# Patient Record
(demographics unavailable — no encounter records)

---

## 2017-01-05 NOTE — DIAGNOSTIC IMAGING REPORT
CHEST ONE VIEW PORTABLE



CLINICAL HISTORY: Sepsis dyspnea



COMPARISON STUDY:  No previous studies for comparison.



FINDINGS: The bones soft tissues and hemidiaphragms are normal. The

cardiomediastinal silhouette is normal. The lungs are clear. The pulmonary

vasculature is normal. 



IMPRESSION:  Negative chest. 









Electronically signed by:  Carlitos Leigh M.D.

1/5/2017 10:46 AM

## 2017-01-05 NOTE — DIAGNOSTIC IMAGING REPORT
EXAMINATION: RENAL ULTRASOUND



CLINICAL HISTORY: Urinary retention    



COMPARISON STUDY:  Biliary ultrasound dated 1/5/2017



FINDINGS: The right kidney measures 13.2 cm. The left kidney measures 12.5 cm. 

There is no evidence of hydronephrosis.  There are no renal masses.



The bladder was nearly empty. There is residual a Ascencio catheter. There is

bladder wall thickening (15 mm).

                 

IMPRESSION : 

1. Bladder wall thickening

2. No renal abnormalities identified







Electronically signed by:  Antoni Andino M.D.

1/5/2017 10:41 PM



Dictated Date/Time:  1/5/2017 10:40 PM

## 2017-01-05 NOTE — DIAGNOSTIC IMAGING REPORT
MRI OF THE BRAIN WITHOUT AND WITH IV CONTRAST



CLINICAL HISTORY: generalized weakness  URINARY RETENTION. LOWER EXTREMITY

WEAKNESS.



COMPARISON STUDY:  No previous studies for comparison. 



TECHNIQUE: MRI of the brain was performed from the vertex to the skull base

utilizing various T1 and T2 weighted sequences. Following the IV administration

of 9 mL of Gadavist contrast, additional enhanced images were obtained.



FINDINGS: 

Sagittal T1, axial diffusion, proton density and T2 weighted axial, coronal

FLAIR, and pre and post axial T1-weighted images were acquired. These were

supplemented with post gadolinium coronal T1 weighted images. 

No intra or extra-axial mass lesions are visualized.

Axial diffusion-weighted images reveal no evidence of acute or subacute

infarction.

There is no evidence of ventricular dilatation.

Proton density T2-weighted and FLAIR images reveal there are multiple foci of

increased T2 signal within the white matter. The distribution and orientation of

the lesions is suspicious for a demyelinating process such as multiple

sclerosis.

There are no abnormal flow voids.

There is no evidence of pathologic enhancement.



There is a 24 mm left occipital scalp mass, suspicious for a sebaceous cyst.



There is complete opacification of the left maxillary sinus. There is

opacification of the left frontal sinus.





IMPRESSION:  

1. Multiple foci of increased T2 signal within the periventricular deep white

matter, and subcortical U fibers. The distribution and orientation of the

lesions is suspicious for demyelinating process such as multiple sclerosis

2. No evidence of pathologic enhancement

3. No evidence of acute or subacute infarction

4. Inflammatory changes in left maxillary and frontal sinuses

5. Left occipital scalp mass likely representing a sebaceous cyst







Electronically signed by:  Antoni Andino M.D.

1/5/2017 5:10 PM



Dictated Date/Time:  1/5/2017 5:06 PM

## 2017-01-05 NOTE — EMERGENCY ROOM VISIT NOTE
History


Report prepared by Zenia:  Nikhil Faulkner


Under the Supervision of:  Dr. Mike Lazcano D.O.


First contact with patient:  09:53


Chief Complaint:  URINARY SYMPTOMS


Stated Complaint:  GENERALIZED WEAKNESS


Nursing Triage Summary:  


INCREASING WEAKNESS WITH FALLS AND URINARY SYMPTOMS SINCE MONDAY.





History of Present Illness


The patient is a 58 year old male who presents to the Emergency Room with 

complaints of persistent weakness that started around 3 days ago. He has had MS 

for 33 years, but it has been getting worse. 3 nights ago, the patient's legs 

gave out and he fell, but did not get hurt. He has also felt tired the past few 

days. The patient still currently feels weak, but is fine if he doesn't try to 

stand up. He also notes smelly and cloudy urine. He has never catheterized 

himself before. The patient has chronic swelling in his legs and back pain. Per 

the patient's girlfriend, the patient had been running a bit of a fever, with 

chills all night in addition to losing control of his bladder. The patient 

denies vomiting, sore throat, cough, or headache. He called his primary care 

physician today (Dr. Moran), and the doctor's nurse told the patient to be taken 

to the ED. The patient does not use any tobacco products. He drinks occasional 

alcohol. He has never had urinary retention. The patient took "Urinary Pain 

Relief" prior to arrival. He takes Coumadin for blood clots in both ankles.





   Source of History:  patient, spouse/significant other


   Onset:  3 days ago


   Position:  other (global - weakness)


   Timing:  other (persistent)


   Associated Symptoms:  + back pain (chronic), + chills, + fatigue, + fevers, 

+ urinary symptoms (losing control of bladder; smelly and cloudy urine), No 

headache, No sorethroat, No vomiting


Note:


Associated symptoms: Chronic swelling in legs.





Review of Systems


See HPI for pertinent positives & negatives. A total of 10 systems reviewed and 

were otherwise negative.





Past Medical & Surgical


Medical Problems:


(1) Multiple sclerosis








Family History





Cancer





Social History


Smoking Status:  Never Smoker


Smokeless Tobacco Use:  No


Alcohol Use:  occasionally


Marital Status:  


Housing Status:  lives alone


Occupation Status:  employed





Current/Historical Medications


Scheduled


Baclofen (Baclofen *), 10 MG PO BID


Multiple Vitamins W/ Minerals (Centrum Multivitamin Flav), 1 TAB PO DAILY


Warfarin Sod (Jantoven), 10 MG PO alternate


Warfarin Sod (Jantoven), 7.5 MG PO alternate





Scheduled PRN


Sildenafil Citrate (Viagra), 1 TAB PO DAILY PRN for PRN





Allergies


Coded Allergies:  


     No Known Allergies (Verified , 11/24/08)





Physical Exam


Vital Signs











  Date Time  Temp Pulse Resp B/P Pulse Ox O2 Delivery O2 Flow Rate FiO2


 


1/5/17 14:22      Room Air  


 


1/5/17 13:30  85 18 138/76 98 Room Air  


 


1/5/17 13:04  79      


 


1/5/17 11:04  74 18 126/75 97 Room Air  


 


1/5/17 10:21     96 Room Air  


 


1/5/17 09:47 37.1 83 18 128/85 97 Room Air  


 


1/5/17 09:47  83      











Physical Exam


GENERAL:  Patient is awake, alert, mildly anxious appearing but comfortable.


EYES: The conjunctivae are clear.  The pupils are round and reactive. 


EARS, NOSE, MOUTH AND THROAT: The nose is without any evidence of any 

deformity. Mucous membranes are moist tongue is midline 


NECK: The neck is nontender and supple.


RESPIRATORY: Normal respiratory effort is noted there is no evidence of 

wheezing rhonchi or rales


CARDIOVASCULAR:  Regular rate and rhythm noted there no murmurs rubs or gallops 

normal S1 normal S2 


GASTROINTESTINAL: The abdomen is mildly distended with suprapubic tenderness to 

palpation. No guarding or rigidity noted.


BACK:  No midline tenderness or or step-off noted range of motion in flexion 

extension as well as rotation no signs of muscle spasm noted


MUSCULOSKELETAL/EXTREMITIES: There is no evidence of gross deformity full range 

of motion is noted in the hips and shoulders


SKIN: Pedal edema bilaterally. No signs of cellulitis.


NEUROLOGIC:  Patient is awake alert and oriented x3.





Medical Decision & Procedures


ER Provider


Diagnostic Interpretation:


X-ray results as stated below per interpretation by me and the radiologist. 








CHEST ONE VIEW PORTABLE





CLINICAL HISTORY: Sepsis dyspnea





COMPARISON STUDY:  No previous studies for comparison.





FINDINGS: The bones soft tissues and hemidiaphragms are normal. The


cardiomediastinal silhouette is normal. The lungs are clear. The pulmonary


vasculature is normal. 





IMPRESSION:  Negative chest. 








Electronically signed by:  Carlitos Leigh M.D.


1/5/2017 10:46 AM





Laboratory Results


1/5/17 10:40








Red Blood Count 4.55, Mean Corpuscular Volume 90.3, Mean Corpuscular Hemoglobin 

31.6, Mean Corpuscular Hemoglobin Concent 35.0, Mean Platelet Volume 10.1, 

Neutrophils (%) (Auto) 88.8, Lymphocytes (%) (Auto) 6.5, Monocytes (%) (Auto) 

4.5, Eosinophils (%) (Auto) 0.0, Basophils (%) (Auto) 0.1, Neutrophils # (Auto) 

9.18, Lymphocytes # (Auto) 0.67, Monocytes # (Auto) 0.46, Eosinophils # (Auto) 

0.00, Basophils # (Auto) 0.01





1/5/17 10:40

















Test


  1/5/17


10:32 1/5/17


10:40 1/5/17


10:58 1/5/17


13:19


 


Urine Color ORANGE    


 


Urine Appearance


  SLIGHTLY


CLOUDY (CLEAR) 


  


  


 


 


Urine pH  (4.5-7.5)    


 


Urine Specific Gravity


  1.019


(1.000-1.030) 


  


  


 


 


Urine Protein NEG (NEG)    


 


Urine Glucose (UA)  (NEG)    


 


Urine Ketones  (NEG)    


 


Urine Occult Blood  (NEG)    


 


Urine Nitrite  (NEG)    


 


Urine Bilirubin  (NEG)    


 


Urine Urobilinogen  (NEG)    


 


Urine Leukocyte Esterase  (NEG)    


 


Urine RBC 0-4 /hpf (0-4)    


 


Urine WBC >30 /hpf (0-5)    


 


Urine Epithelial Cells


  10-20 /lpf


(0-5) 


  


  


 


 


Urine Bacteria 4+ (NEG)    


 


White Blood Count


  


  10.33 K/uL


(4.8-10.8) 


  


 


 


Red Blood Count


  


  4.55 M/uL


(4.7-6.1) 


  


 


 


Hemoglobin


  


  14.4 g/dL


(14.0-18.0) 


  


 


 


Hematocrit  41.1 % (42-52)   


 


Mean Corpuscular Volume


  


  90.3 fL


() 


  


 


 


Mean Corpuscular Hemoglobin


  


  31.6 pg


(25-34) 


  


 


 


Mean Corpuscular Hemoglobin


Concent 


  35.0 g/dl


(32-36) 


  


 


 


Platelet Count


  


  156 K/uL


(130-400) 


  


 


 


Mean Platelet Volume


  


  10.1 fL


(7.4-10.4) 


  


 


 


Neutrophils (%) (Auto)  88.8 %   


 


Lymphocytes (%) (Auto)  6.5 %   


 


Monocytes (%) (Auto)  4.5 %   


 


Eosinophils (%) (Auto)  0.0 %   


 


Basophils (%) (Auto)  0.1 %   


 


Neutrophils # (Auto)


  


  9.18 K/uL


(1.4-6.5) 


  


 


 


Lymphocytes # (Auto)


  


  0.67 K/uL


(1.2-3.4) 


  


 


 


Monocytes # (Auto)


  


  0.46 K/uL


(0.11-0.59) 


  


 


 


Eosinophils # (Auto)


  


  0.00 K/uL


(0-0.5) 


  


 


 


Basophils # (Auto)


  


  0.01 K/uL


(0-0.2) 


  


 


 


RDW Standard Deviation


  


  42.4 fL


(36.4-46.3) 


  


 


 


RDW Coefficient of Variation


  


  12.8 %


(11.5-14.5) 


  


 


 


Immature Granulocyte % (Auto)  0.1 %   


 


Immature Granulocyte # (Auto)


  


  0.01 K/uL


(0.00-0.02) 


  


 


 


Erythrocyte Sedimentation Rate


  


  41 mm/hr


(0-14) 


  


 


 


Prothrombin Time


  


  24.7 SECONDS


(9.0-12.0) 


  


 


 


Prothromb Time International


Ratio 


  2.2 (0.9-1.1) 


  


  


 


 


Activated Partial


Thromboplast Time 


  52.7 SECONDS


(21.0-31.0) 


  


 


 


Partial Thromboplastin Ratio  2.0   


 


Anion Gap


  


  8.0 mmol/L


(3-11) 


  


 


 


Est Creatinine Clear Calc


Drug Dose 


  80.4 ml/min 


  


  


 


 


Estimated GFR (


American) 


  85.3 


  


  


 


 


Estimated GFR (Non-


American 


  73.6 


  


  


 


 


BUN/Creatinine Ratio  10.6 (10-20)   


 


Calcium Level


  


  8.9 mg/dl


(8.5-10.1) 


  


 


 


Phosphorus Level


  


  1.7 mg/dl


(2.5-4.9) 


  


 


 


Magnesium Level


  


  2.5 mg/dl


(1.8-2.4) 


  


 


 


Total Bilirubin


  


  0.6 mg/dl


(0.2-1) 


  


 


 


Aspartate Amino Transf


(AST/SGOT) 


  52 U/L (15-37) 


  


  


 


 


Alanine Aminotransferase


(ALT/SGPT) 


  91 U/L (12-78) 


  


  


 


 


Alkaline Phosphatase


  


  206 U/L


() 


  


 


 


Total Creatine Kinase


  


  224 U/L


() 


  


 


 


Creatine Kinase MB


  


  < 0.5 ng/ml


(0.5-3.6) 


  


 


 


Creatine Kinase MB Ratio   (0-3.0)   


 


Troponin I


  


  < 0.015 ng/ml


(0-0.045) 


  


 


 


C-Reactive Protein


  


  16.40 mg/dl


(0-0.29) 


  


 


 


Total Protein


  


  8.0 gm/dl


(6.4-8.2) 


  


 


 


Albumin


  


  3.7 gm/dl


(3.4-5.0) 


  


 


 


Globulin


  


  4.3 gm/dl


(2.5-4.0) 


  


 


 


Albumin/Globulin Ratio  0.9 (0.9-2)   


 


Lipase


  


  170 U/L


() 


  


 


 


Thyroid Stimulating Hormone


(TSH) 


  0.924 uIu/ml


(0.300-4.500) 


  


 


 


Bedside Lactic Acid Venous


  


  


  1.32 mmol/L


(0.90-1.70) 


 





Laboratory results per my review.





Medications Administered











 Medications


  (Trade)  Dose


 Ordered  Sig/Miguel


 Route  Start Time


 Stop Time Status Last Admin


Dose Admin


 


 Sodium Chloride


  (Nss 1000ml)  500 ml @ 


 999 mls/hr  Q31M ONCE


 IV  1/5/17 10:03


 1/5/17 10:33 DC 1/5/17 10:03


999 MLS/HR


 


 Ceftriaxone Sodium


  (Rocephin Inj)  1 gm  NOW  STAT


 IV  1/5/17 11:24


 1/5/17 11:25 DC 1/5/17 11:57


1 GM











ECG


Indication:  weakness


Rate (beats per minute):  80


Rhythm:  normal sinus


Findings:  no ectopy, other (incomplete RBBB noted, no acute ST segment 

abnormalities)


Comparison ECG Date:  no prior available





ED Course


0958: The patient was evaluated in room B4B. A complete history and physical 

examination were performed. 





1003: Ordered  ml @ 999 mls/hr IV.





1124: Ordered Rocephin Inj 1 gm IV.





1148: I reevaluated the patient and he is resting comfortably. The patient 

verbally expressed understanding and agreement of the treatment plan. The 

patient will be evaluated for further treatment.





1151: [facility] - he will evaluate 

the patient for further treatment.





Medical Decision








Differential diagnosis:


Etiologies such as viral syndrome, otitis, pharyngitis, pneumonia, influenza, 

meningitis, urinary tract infection, sepsis, bacteremia, as well as others were 

entertained.





Nursing notes reviewed.





Additional history is obtained from the patient's significant other.





The patient is a 58-year-old male who was a history of MS who presented to the 

emergency department for an evaluation of lower extremity weakness. He also has 

been noticing that he had a fever as an outpatient and is been having 

difficulty urinating. The patient is normally able to urinate on his own but 

has noticed urinary incontinence recently. The patient was found to have 

urinary retention as well as signs of urinary tract infection on urinalysis. I 

discussed the patient's laboratory and radiographic studies with him. At this 

time I feels condition is likely consistent with an MS flare with superimposed 

urinary tract infection. Given his reported fever I do feel that he is high 

risk for this infection. This reason I discussed his case with the on-call 

[facility] hospitalist group. They have agreed to evaluate the patient in 

the emergency department for further management and disposition.





Consults


Time Called:  1150


Consulting Physician:  Dr. Milagro HUBBARD internist


Returned Call:  1151


I discussed the patient with Dr. Milagro HUBBARD internist - he will evaluate the 

patient for further treatment.





Impression





 Primary Impression:  Exacerbation of multiple sclerosis


 Additional Impressions:  Lower extremity weakness, Urinary retention, UTI (

urinary tract infection)





Scribe Attestation


The scribe's documentation has been prepared under my direction and personally 

reviewed by me in its entirety. I confirm that the note above accurately 

reflects all work, treatment, procedures, and medical decision making performed 

by me.





Departure Information


Dispostion


Being Evaluated By Hospitalist





Referrals


Mike Moran M.D. (PCP)





Patient Instructions


A Signature Page, My [facility]

## 2017-01-05 NOTE — DIAGNOSTIC IMAGING REPORT
MRI LUMBAR SPINE COMBINATION



CLINICAL HISTORY: Left lower extremity weakness, urinary retention. Possible

cauda equina syndrome.    



TECHNIQUE: Sagittal and axial T1, T2 and STIR images were obtained. Imaging was

performed before and after the administration of 9 cc of intravenous Gadavist



COMPARISON STUDY:  Conventional radiographic study of the lumbar spine dated

10/26/2016 



OBSERVATIONS:



The vertebral bodies and posterior elements appear intact. There is no abnormal

bony signal present to suggest a marrow replacement process.



L1-2: There is a mild circumferential disc bulge present. There is no

significant spinal or foraminal stenosis.



L2-3: There is a circumferential disc bulge present. There is an annular fissure

present. There is mild spinal stenosis. There is minor bilateral foraminal

narrowing.



L3-4: There is a circumferential disc bulge and small central disc protrusion.

There is moderate spinal stenosis present. There is no significant foraminal

narrowing



L4-5: There is a small annular fissure. There is no significant spinal or

foraminal stenosis. No focal herniations are visualized



L5-S1: There is a circumferential disc bulge present. There is no significant

spinal stenosis. There is mild bilateral foraminal narrowing.



Postcontrast images reveal no pathologic enhancement. On the sagittal

T2-weighted images there is equivocal linear increase in the distal cord at the

T11-12 level.



IMPRESSION: 

1. Multilevel spondylitic changes

2. Mild spinal stenosis at the L2-3 level, and moderate spinal stenosis at the

L3-4 level.

3. On the sagittal images, there is an equivocal thin linear focus of increased

T2 signal within the distal thoracic cord at the T11-12 level

4. No evidence of pathologic enhancement. No evidence of pathologic marrow

replacement.







Electronically signed by:  Antoni Andino M.D.

1/5/2017 4:59 PM



Dictated Date/Time:  1/5/2017 4:53 PM

## 2017-01-05 NOTE — DIAGNOSTIC IMAGING REPORT
BILIARY ULTRASOUND



CLINICAL HISTORY: elevated LFTs    



COMPARISON STUDY:  No previous studies for comparison.



FINDINGS: The pancreas appears sonographically normal. The gallbladder is

contracted. No calculi are visualized. There is no ductal dilatation. The common

bile duct measures 3 mm. No hepatic masses are visualized. There is no

right-sided hydronephrosis



IMPRESSION:  Normal biliary ultrasound 









Electronically signed by:  Antoni Andino M.D.

1/5/2017 6:46 PM



Dictated Date/Time:  1/5/2017 6:45 PM

## 2017-01-05 NOTE — HISTORY AND PHYSICAL
History & Physical


Date & Time of Service:


Jan 5, 2017 at 13:15


Chief Complaint:


Generalized Weakness


Primary Care Physician:


Cedrick Moran M.D.


History of Present Illness


Source:  patient, family


Patient is a pleasant 59 y/o male, with PMHx of MS and DVT, who presented to 

the ED because of generalized weakness x4 days. Since Monday (1/5), patient has 

been experiencing falls- he denies any injuries. He states he is extremely 

weak. When he tries to stand, he cannot hold his weight up. Patient also admits 

to urinary incontinence x1. While in the ED, a Rowell was placed and patient 

voided +4L of urine. Patient does not take any medications for MS except 

Baclofen. It has been years since he has had a follow-up or brain MRI. Per 

patient he has never been hospitalized with an acute MS flare. He has had MS 

for 33 years. He routinely goes to the gym. Up until this event, he was able to 

ambulate with a walker without any significant difficulties. He has been eating 

and drinking OK. He denies any recent illness. He denies any recent ill 

contacts. He has been on Warfarin for 10+ years. He admits to unchanged chronic 

lower lumbar pain. Patient denies any fever, chills, sweats, lightheadedness, 

dizziness, vision changes, CP, palpitations, edema, SOB, wheezing, cough, 

abdominal pain, nausea, vomiting, diarrhea, melena, numbness/tingling, anxiety/

depression, active bleeding, or new skin discoloration/changes.





Past Medical/Surgical History


1. MS


2. DVT   








Family History





Cancer





Social History


Smoking Status:  Never Smoker


Smokeless Tobacco Use:  No


Marital Status:  


Occupational Status:  employed





Immunizations


History of Influenza Vaccine:  Yes


History of Tetanus Vaccine?:  Unknown


History of Pneumococcal:  Unknown


History of Hepatitis B Vaccine:  Unknown





Allergies


Coded Allergies:  


     No Known Allergies (Verified , 11/24/08)





Home Medications


Scheduled


Baclofen (Baclofen *), 10 MG PO BID


Multiple Vitamins W/ Minerals (Centrum Multivitamin Flav), 1 TAB PO DAILY


Warfarin Sod (Jantoven), 10 MG PO alternate


Warfarin Sod (Jantoven), 7.5 MG PO alternate





Scheduled PRN


Sildenafil Citrate (Viagra), 1 TAB PO DAILY PRN for PRN





Physical Exam


Vital Signs











  Date Time  Temp Pulse Resp B/P Pulse Ox O2 Delivery O2 Flow Rate FiO2


 


1/5/17 13:04  79      


 


1/5/17 11:04  74 18 126/75 97 Room Air  


 


1/5/17 10:21     96 Room Air  


 


1/5/17 09:47 37.1 83 18 128/85 97 Room Air  


 


1/5/17 09:47  83      








General Appearance:  no apparent distress


Head:  normocephalic, atraumatic


Eyes:  normal inspection, PERRL


ENT:  hearing grossly normal


Neck:  supple


Respiratory/Chest:  lungs clear, no respiratory distress, no accessory muscle 

use


Cardiovascular:  regular rate, rhythm, normal peripheral pulses


Abdomen/GI:  normal bowel sounds, non tender, soft


Back:  normal inspection


Extremities/Musculoskelatal:  no calf tenderness, + swelling (+1 pitting edema 

of bilateral lower extremities- baseline per patient )


Neurologic/Psych:  no motor/sensory deficits (no major/alarming motor/sensory 

deficits ), alert, normal mood/affect, oriented x 3


Skin:  normal color, warm/dry, no rash





Diagnostics


Laboratory Results





Results Past 24 Hours








Test


  1/5/17


10:32 1/5/17


10:40 1/5/17


10:58 Range/Units


 


 


Urine Color ORANGE    


 


Urine Appearance SLIGHTLY CLOUDY   CLEAR  


 


Urine pH    4.5-7.5  


 


Urine Specific Gravity 1.019   1.000-1.030  


 


Urine Protein NEG   NEG  


 


Urine Glucose (UA)    NEG  


 


Urine Ketones    NEG  


 


Urine Occult Blood    NEG  


 


Urine Nitrite    NEG  


 


Urine Bilirubin    NEG  


 


Urine Urobilinogen    NEG  


 


Urine Leukocyte Esterase    NEG  


 


Urine RBC 0-4   0-4  /hpf


 


Urine WBC >30   0-5  /hpf


 


Urine Epithelial Cells 10-20   0-5  /lpf


 


Urine Bacteria 4+   NEG  


 


White Blood Count  10.33  4.8-10.8  K/uL


 


Red Blood Count  4.55  4.7-6.1  M/uL


 


Hemoglobin  14.4  14.0-18.0  g/dL


 


Hematocrit  41.1  42-52  %


 


Mean Corpuscular Volume  90.3    fL


 


Mean Corpuscular Hemoglobin  31.6  25-34  pg


 


Mean Corpuscular Hemoglobin


Concent 


  35.0


  


  32-36  g/dl


 


 


Platelet Count  156  130-400  K/uL


 


Mean Platelet Volume  10.1  7.4-10.4  fL


 


Neutrophils (%) (Auto)  88.8   %


 


Lymphocytes (%) (Auto)  6.5   %


 


Monocytes (%) (Auto)  4.5   %


 


Eosinophils (%) (Auto)  0.0   %


 


Basophils (%) (Auto)  0.1   %


 


Neutrophils # (Auto)  9.18  1.4-6.5  K/uL


 


Lymphocytes # (Auto)  0.67  1.2-3.4  K/uL


 


Monocytes # (Auto)  0.46  0.11-0.59  K/uL


 


Eosinophils # (Auto)  0.00  0-0.5  K/uL


 


Basophils # (Auto)  0.01  0-0.2  K/uL


 


RDW Standard Deviation  42.4  36.4-46.3  fL


 


RDW Coefficient of Variation  12.8  11.5-14.5  %


 


Immature Granulocyte % (Auto)  0.1   %


 


Immature Granulocyte # (Auto)  0.01  0.00-0.02  K/uL


 


Erythrocyte Sedimentation Rate  41  0-14  mm/hr


 


Prothrombin Time


  


  24.7


  


  9.0-12.0


SECONDS


 


Prothromb Time International


Ratio 


  2.2


  


  0.9-1.1  


 


 


Activated Partial


Thromboplast Time 


  52.7


  


  21.0-31.0


SECONDS


 


Partial Thromboplastin Ratio  2.0   


 


Sodium Level  137  136-145  mmol/L


 


Potassium Level  4.1  3.5-5.1  mmol/L


 


Chloride Level  103    mmol/L


 


Carbon Dioxide Level  26  21-32  mmol/L


 


Anion Gap  8.0  3-11  mmol/L


 


Blood Urea Nitrogen  12  7-18  mg/dl


 


Creatinine


  


  1.10


  


  0.60-1.40


mg/dl


 


Est Creatinine Clear Calc


Drug Dose 


  80.4


  


   ml/min


 


 


Estimated GFR (


American) 


  85.3


  


   


 


 


Estimated GFR (Non-


American 


  73.6


  


   


 


 


BUN/Creatinine Ratio  10.6  10-20  


 


Random Glucose  99  70-99  mg/dl


 


Calcium Level  8.9  8.5-10.1  mg/dl


 


Phosphorus Level  1.7  2.5-4.9  mg/dl


 


Magnesium Level  2.5  1.8-2.4  mg/dl


 


Total Bilirubin  0.6  0.2-1  mg/dl


 


Aspartate Amino Transf


(AST/SGOT) 


  52


  


  15-37  U/L


 


 


Alanine Aminotransferase


(ALT/SGPT) 


  91


  


  12-78  U/L


 


 


Alkaline Phosphatase  206    U/L


 


Total Creatine Kinase  224    U/L


 


Creatine Kinase MB  < 0.5  0.5-3.6  ng/ml


 


Creatine Kinase MB Ratio    0-3.0  


 


Troponin I  < 0.015  0-0.045  ng/ml


 


C-Reactive Protein  16.40  0-0.29  mg/dl


 


Total Protein  8.0  6.4-8.2  gm/dl


 


Albumin  3.7  3.4-5.0  gm/dl


 


Globulin  4.3  2.5-4.0  gm/dl


 


Albumin/Globulin Ratio  0.9  0.9-2  


 


Lipase  170    U/L


 


Bedside Lactic Acid Venous


  


  


  1.32


  0.90-1.70


mmol/L








 Microbiology Results


1/5/17 Blood Culture, Received


         Pending


1/5/17 Blood Culture, Received


         Pending


1/5/17 Urine Culture, Received


         Pending





Diagnostic Radiology


CHEST ONE VIEW PORTABLE





CLINICAL HISTORY: Sepsis dyspnea





COMPARISON STUDY:  No previous studies for comparison.





FINDINGS: The bones soft tissues and hemidiaphragms are normal. The


cardiomediastinal silhouette is normal. The lungs are clear. The pulmonary


vasculature is normal. 





IMPRESSION:  Negative chest. 














Electronically signed by:  Carlitos Leigh M.D.


1/5/2017 10:46 AM





 The status of this report is Signed.   


 Draft = Not yet reviewed or approved by Radiologist.  


 Signed = Reviewed and approved by Radiologist.





EKG


CEDRICK HERNANDEZ ID:[id number] 05-JAN-2017 10:13:41 [facility]


Normal sinus rhythm


Incomplete right bundle branch block


Borderline ECG


No previous ECGs available


25mm/s 10mm/mV 150Hz 8.0 SP2 12SL 241 [id number]: [id number]


Referred by: Referred Self Unconfirmed


Vent. rate 80 BPM


DE interval 138 ms


QRS duration 98 ms


QT/QTc 348/401 ms


P-R-T axes 48 9 32


1958 (58 yr)


Male 


Room:


Loc:15


Technician





Impression


Assessment and Plan


59 y/o male, with PMHx of MS and DVT, who presented to the ED because of 

generalized weakness x4 days.





?MS flare with generalized weakness:


-Admit med/surg


-Consult neurology, appreciate recommendations


   --Need to r/o all other causes for weakness prior to starting IV steroids 


-Continue Baclofen 10 mg PO BID 


-Pending blood cultures


-Follow CBC and PRP 


-Check b12, folate, and TSH


-Check lyme and RPR serology 


-MRI combo of brain  


-Consult PT/OT





Urinary retention:


-U/A dirty, pending urine culture 


-Check MRI of lumbar spine 





Elevated LFTs:


-Check liver U/S





hx of DVT:


-Continue Coumadin 10 mg PO QOD and 7.5 mg PO QOD





DVT prophylaxis:


-Continue Coumadin 


-ROSLYN and SCDs





GI Prophylaxis:


-Maalox PRN


-IV Zofran PRN


-Colace and/or Milk of Mag PRN





Code Status:


-LEVEL I, FULL





Level of Care


Med/Surg





Resuscitation Status


FULL RESUSCITATION





VTE Prophylaxis


VTE Risk Assessment Done? Y/N:  Yes


Risk Level:  Low


Given or contraindicated:  Warfarin (Coumadin), T.E.D. Stockings, SCD's





Reviewed:  Pt Seen/Exam by NEENA Lu Notes, Labs, RAD, EKG


History


Agree with above PA H&P/ROS/PMH/PSH. Pt having significant weakness more 

pronounced on his right side which is the side he has had progressively 

worsening weakness on for many years. He just spiked a fever when he got to the 

floor tonight. His brain MRI did show multiple MS type lesions and the L-spine 

MRI also showed an equivocal T11 cord lesion. Brain MRI also showed left 

maxillary and frontal sinusitis. He does admit he has had some scalp tenderness 

more on top of his head and did have subjective fevers at home for the last 2-3 

days. His son and daughter in law were just visiting and right afterwards came 

down with the flu.


Since placement of his Rowell in the ER and draining 4L immediately, he has 

continued to have a very brisk diuresis with a total of 8000 mL in the last 13 

hours approximately.


Constitutional:  acknowledges: fever, weakness


EENTM:  acknowledges: other (scap tenderness, watery eyes)


Respiratory:  negative: cough, short of breath


Cardiovascular:  denies chest pain


Gastrointestinal/Abdominal:  negative: abdominal pain, diarrhea, nausea


Genitourinary:  positive other (retention)


Musculoskeletal:  positive: back pain (lower)


Skin:  negative: rash


Neurological/Psych:  positive: numbness (in right 4th and 5th fingers x 25 yrs)

, weakness (generalized but more so in right hand and right leg, right leg 

chronic weakness and foot drop x 25 yrs), negative: headache


Hematologic/Lymphatic:  positive: blood clots (h/o DVT)


All Other Systems:  Reviewed and Negative


General Appearance:  WD/WN, no apparent distress


Eye Exam:  bilateral eye other (mild watery discharge OU)


Ears, Nose, Throat:  hearing grossly normal, other (no facial tenderness, no 

droop of face)


Neck:  non-tender, normal inspection, trachea midline


Respiratory:  lungs clear, normal breath sounds, no respiratory distress, no 

accessory muscle use, respiratory distress


Cardiovascular:  normal peripheral pulses, regular rate, rhythm, no gallop, no 

murmur, other (trace edema legs bilat)


Gastrointestinal:  normal bowel sounds, non tender, soft, no organomegaly, 

other (Rowell catheter in place, normal appearing genitalia)


Extremities:  non-tender


Neurologic/Psychiatric:  alert, normal mood/affect, oriented x 3, motor 

weakness (RUE with 4/5 strength throughout and 3/5 strength in wrist extension 

and dorsal interosseous; LUE 5/5 strength throughout; RLE 2/5 strength 

throughout; LLE 4/5 strength throughout), sensory deficit (rt 4th and 5th 

fingers)


Skin Characteristics:  normal color, warm/dry


Assessment/Plan


Agree with above PA A/P as above with the following exceptions/additions:





Fever, UTI, Sinusitis on MRI brain, MS flare secondary to acute illness-->





-check flu swab


-treat for acute sinusitis and UTI with Rocephin 1 gram IC q12h


-follow Urcx and BCxs


-Spoke with Dr. Villalobos on the phone, will start IV Solu Medrol qday x 3 days after 

antibiotics have been on board for about a day


-PT/OT consults





Urinary retention/brisk diuresis of 8L urine in 13 hours-


-continue Rowell catheter for now and voiding trial after MS flare improves


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


-repeat PRP now to ensure no lyte abnormalities


-may need Urology consult and teaching for self-cath if fails voiding trial





Proph-on coumadin, follow INR

## 2017-01-06 NOTE — UROLOGY CONSULTATION
History


General


Date of Service:


Jan 6, 2017.


Primary Care Physician:


Mike Moran M.D.


Pt seen a urologist before?:  No





Imaging


Imaging:  Ultrasound





Laboratory


Labs were reviewed and are within normal limits unless listed below. Labs are 

available in the chart and at [facility]





Problem List


Medical Problems:


(1) Exacerbation of multiple sclerosis


Status: Acute  





(2) Lower extremity weakness


Status: Acute  





(3) Urinary retention


Status: Acute  





(4) UTI (urinary tract infection)


Status: Acute  











Past History


deep vein thrombosis, other (MS)





Family History





Cancer





Social History


Marital status:  


Occupation status:  employed





Immunizations


History of Influenza Vaccine:  Yes


History of Tetanus Vaccine?:  Unknown


History of Pneumococcal:  Unknown


History of Hepatitis B Vaccine:  Unknown





Allergies


Coded Allergies:  


     No Known Allergies (Verified , 11/24/08)





Medications


Home Medications:





Home Meds and Scripts








 Medications  Dose


 Route/Sig


 Max Daily Dose Days Date Category


 


 Viagra


  (Sildenafil


 Citrate) 100 Mg


 Tab  1 Tab


 PO DAILY PRN


    1/19/16 Reported


 


 Centrum


 Multivitamin Flav


  (Multiple


 Vitamins W/


 Minerals) 1 Pow


 Pow  1 Tab


 PO DAILY


    1/19/16 Reported


 


 Jantoven


  (Warfarin Sodium)


 7.5 Mg Tab  7.5 Mg


 PO ALTERNATE


    1/19/16 Reported


 


 Jantoven


  (Warfarin Sodium)


 10 Mg Tab  10 Mg


 PO ALTERNATE


    1/19/16 Reported


 


 Baclofen *


  (Baclofen) 10 Mg


 Tab  10 Mg


 PO BID


    11/24/08 Reported








Inpatient Medications:





 Current Inpatient Medications








 Medications


  (Trade)  Dose


 Ordered  Sig/Miguel


 Route  Start Time


 Stop Time Status Last Admin


Dose Admin


 


 Acetaminophen


  (Tylenol Tab)  650 mg  Q4H  PRN


 PO  1/5/17 13:00


 2/4/17 12:59  1/5/17 20:29


650 MG


 


 Al Hydrox/Mg


 Hydrox/Simethicone


  (Maalox Max Susp)  15 ml  Q4H  PRN


 PO  1/5/17 13:00


 2/4/17 12:59   


 


 


 Magnesium


 Hydroxide


  (Milk Of


 Magnesia Susp)  30 ml  Q6H  PRN


 PO  1/5/17 13:00


 2/4/17 12:59   


 


 


 Polyethylene


  (Miralax Powder


 Packet)  17 gm  DAILY  PRN


 PO  1/5/17 13:00


 2/4/17 12:59   


 


 


 Ondansetron HCl


  (Zofran Inj)  4 mg  Q6H  PRN


 IV  1/5/17 13:00


 2/4/17 12:59   


 


 


 Baclofen


  (Lioresal Tab)  10 mg  BID


 PO  1/5/17 20:00


 2/4/17 20:59  1/6/17 09:29


10 MG


 


 Warfarin Sodium


  (Coumadin Tab)  7.5 mg  Q2D@1600


 PO  1/6/17 16:00


 2/5/17 15:59   


 


 


 Warfarin Sodium


  (Coumadin Tab)  10 mg  Q2D@1600


 PO  1/5/17 16:00


 2/4/17 15:59  1/5/17 20:33


10 MG


 


 Gadobutrol 9 mmol  9 mmol  UD  PRN


 IV  1/5/17 16:45


 1/9/17 16:44   


 


 


 Ceftriaxone


 Sodium 2000 mg/


 Dextrose  70 ml @ 


 100 mls/hr  Q24H


 IV  1/5/17 22:00


 1/15/17 21:59  1/6/17 00:37


100 MLS/HR


 


 Methylprednisolone


 Sodium Succinate/


 Dextrose


  (Solu-Medrol IV/


 D5 250ml)  266 ml @ 


 250 mls/hr  DAILY


 IV  1/6/17 08:00


 1/8/17 10:00  1/6/17 09:29


250 MLS/HR


 


 Potassium/


 Phosphorus/Sodium


  (Phospha 250


 Neutral


 155-852-130 Mg)  1 tab  QID


 PO  1/6/17 12:00


 2/5/17 11:59  1/6/17 11:28


1 TAB


 


 Tamsulosin HCl


  (Flomax Cap)  0.4 mg  QAM


 PO  1/7/17 08:00


 2/6/17 07:59  1/6/17 11:27


0.4 MG


 


 Lidocaine HCl


  (Xylocaine Jelly


 2%)  1 ml  Q4  PRN


 EXT  1/6/17 11:30


 2/5/17 11:29   


 











Physical Exam


Vital Signs:





 Vital Signs Past 12 Hours








  Date Time  Temp Pulse Resp B/P Pulse Ox O2 Delivery O2 Flow Rate FiO2


 


1/6/17 12:19      Room Air  


 


1/6/17 08:14 37.8 87 16 103/63 94 Room Air  











Assessment & Plan


Assessment & Plan


Imaging:  Ultrasound

## 2017-01-06 NOTE — NEUROLOGY CONSULTATION
Neurology Consultation


Date of Consultation:


2017.


Attending Physician:


Mary Ellen Wallace MD


Primary Care Physician:


Mike Moran M.D.


Reason for Consultation:


Patient is a 58-year-old, who was asked to see the request of Dr. Wallace, for 

neurologic consultation regarding multiple sclerosis and acute weakness.


History of Present Illness


Source:  patient, caregiver, clinic records, hospital records


The patient started getting symptoms in his 20s.  He started out with right 

optic neuritis and then a year later left optic neuritis and being diagnosed 

with multiple sclerosis by Dr. Helton at [facility].  He was on 

Avonex and then Copaxone but stopped these over 10 years ago.  He has not been 

on a disease modifying agent since.  He has had chronic gradual changes over 

time and was last seen by Dr. Alonso in .  The patient was wearing an AFO on 

the right foot and used a cane for support.  He has chronic right hand and foot 

weakness.  The patient has no significant cognitive issues or visual problems 

over time and has not really had any urinary issues in the past.





The patient woke up at 0400 hours on January 3 and collapsed on the floor due 

to leg weakness.  He had increased numbness as well.  He was admitted to the 

hospital on  with increased weakness and numbness.  He had abdominal 

distention and discomfort as well.  Vital signs were unremarkable and chest x-

ray was negative.





He was straight cathed and produced 4 L of urine.  Urinalysis is showing gram-

negative bacilli.  CBC was unremarkable.  He's been mildly febrile and rest of 

his laboratory studies were fairly unremarkable.





MRI of the brain showed multiple white matter spots of an old nature.  There 

were no acute or active lesions.  There is no history of infarct.  He did have 

some sinusitis.  Lesions were typical of MS and there were lesions 

perpendicular to the corpus callosum.


MRI of the lumbar spine showed diffuse multilevel spondylosis with mild 

stenosis at L2-3. 


.  In addition, there was an acute brightly her focus in the lower cord around 

T11.





Past Medical/Surgical History


Medical Problems:


(1) Exacerbation of multiple sclerosis


Status: Acute  





(2) Lower extremity weakness


Status: Acute  





(3) Urinary retention


Status: Acute  





(4) UTI (urinary tract infection)


Status: Acute





Patient has no history of hypertension, diabetes, heart disease, or stroke.


The patient has been on Coumadin for the last 8 years for blood clots in his 

ankles bilaterally.


He had surgery on his left jaw for fracture in .  








Family History


Mother  age 69 of pancreatic cancer.


Father  age 78 of chronic lymphocytic leukemia.


There is no family history of MS





Social History


Patient has never smoked cigarettes.


He will drink about 10 beers per week





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


Smoking Status:  Never smoker


Smokeless Tobacco Use:  No


Alcohol Use:  occasionally


Drug Use:  other (Caffeine)


Marital Status:  


Housing Status:  lives alone


Occupation Status:  employed





Allergies


Coded Allergies:  


     No Known Allergies (Verified , 08)





Current Inpatient Medications





 Current Inpatient Medications








 Medications


  (Trade)  Dose


 Ordered  Sig/Miguel


 Route  Start Time


 Stop Time Status Last Admin


Dose Admin


 


 Acetaminophen


  (Tylenol Tab)  650 mg  Q4H  PRN


 PO  17 13:00


 17 12:59  17 20:29


650 MG


 


 Al Hydrox/Mg


 Hydrox/Simethicone


  (Maalox Max Susp)  15 ml  Q4H  PRN


 PO  17 13:00


 17 12:59   


 


 


 Magnesium


 Hydroxide


  (Milk Of


 Magnesia Susp)  30 ml  Q6H  PRN


 PO  17 13:00


 17 12:59   


 


 


 Polyethylene


  (Miralax Powder


 Packet)  17 gm  DAILY  PRN


 PO  17 13:00


 17 12:59   


 


 


 Ondansetron HCl


  (Zofran Inj)  4 mg  Q6H  PRN


 IV  17 13:00


 17 12:59   


 


 


 Baclofen


  (Lioresal Tab)  10 mg  BID


 PO  17 20:00


 17 20:59  17 09:29


10 MG


 


 Warfarin Sodium


  (Coumadin Tab)  7.5 mg  Q2D@1600


 PO  17 16:00


 17 15:59   


 


 


 Warfarin Sodium


  (Coumadin Tab)  10 mg  Q2D@1600


 PO  17 16:00


 17 15:59  17 20:33


10 MG


 


 Gadobutrol 9 mmol  9 mmol  UD  PRN


 IV  17 16:45


 17 16:44   


 


 


 Ceftriaxone


 Sodium 2000 mg/


 Dextrose  70 ml @ 


 100 mls/hr  Q24H


 IV  17 22:00


 1/15/17 21:59  17 00:37


100 MLS/HR


 


 Methylprednisolone


 Sodium Succinate/


 Dextrose


  (Solu-Medrol IV/


 D5 250ml)  266 ml @ 


 250 mls/hr  DAILY


 IV  17 08:00


 17 10:00  17 09:29


250 MLS/HR


 


 Potassium/


 Phosphorus/Sodium


  (Phospha 250


 Neutral


 155-852-130 Mg)  1 tab  QID


 PO  17 12:00


 17 11:59   


 











Review of Systems


Constitutional:  + fatigue, + weakness


Eyes:  No diplopia, No worsening of vision


ENT:  No hearing loss, No tinnitus


Respiratory:  No cough, No shortness of breath


Cardiovascular:  No chest pain, No palpitations


Abdomen:  No nausea, No pain


Musculoskeletal:  + joint pain, No muscle pain


Genitourinary - Male:  + urinary retention


Neurologic:  + balance problems, + numbness/tingling, + weakness, No memory loss


Psychiatric:  No anxiety, No depression symptoms


Endocrine:  + fatigue


Hematologic / Lymphatic:  No abnormal bleeding/bruising


Integumentary:  No rash


Allergic / Immunologic:  No hives





Physical Exam


Vital Signs (Past 24 Hrs):











  Date Time  Temp Pulse Resp B/P Pulse Ox O2 Delivery O2 Flow Rate FiO2


 


17 08:14 37.8 87 16 103/63 94 Room Air  


 


17 00:13 37.5 71 18 109/58 94 Room Air  


 


17 00:00      Room Air  


 


17 21:44 37.7       


 


17 20:30     95 Room Air  


 


17 20:00 38.2 86 20 133/76 95 Room Air  


 


17 19:06 37.1 81 18 136/77 96   


 


17 17:52  81 18 136/77 96 Room Air  


 


17 16:33  87 15 137/74 95 Room Air  


 


17 14:22      Room Air  


 


17 13:30  85 18 138/76 98 Room Air  


 


17 13:04  79      


 


17 11:04  74 18 126/75 97 Room Air  








Patient is right-handed.  





The patient is awake and alert.  Speech is normal without aphasia or 

dysarthria.  Mentation and thought processes are intact with orientation and 

normal fund of knowledge.  Mood and affect are normal and appropriate.  

Appearance and grooming are normal.





The discs are sharp with positive venous pulsations.  Pupils are 4mm 

bilaterally and reactive to light.  I did not note any afferent pupillary 

defect bilaterally.  Extraocular eye muscles are intact without nystagmus.  

Visual acuity and visual fields seem normal grossly to confrontation.





There are no deficits to sensation of the face bilaterally.  Corneal reflexes 

are positive bilaterally.  Facial strength and symmetry is normal bilaterally.  

Hearing seems intact grossly to voice and finger rub.  Palate moves well 

without asymmetry.  There is normal sternocleidomastoid and trapezius strength 

bilaterally.  Tongue is midline with good strength bilaterally.





Neck is with full range of motion without discomfort.  There are no cervical 

bruits.  There are no cranial or ocular bruits.





Heart is without murmur.





Cervical, thoracic, and lumbar spine are nontender to palpation.





Gait is is not tested but stance sitting up in the bedside chair is normal.  





With outstretched arms there is mild drift on the right.  There are no resting, 

postural, or action tremors.  There is mild ataxia with finger-to-nose testing 

on the right.  There is decreased facility in the right hand.  There are no 

abnormal involuntary movements noted.





Motor strength is 4/5 diffusely in the left upper extremity and 3/5 proximally 

in the right upper extremity as well as 1-2/5 distally In the right upper 

extremity.  Leg strength is 1/5 diffusely in the right leg and 3-4/5 strength 

in the left leg.  The limbs spasticity on the right and there is atrophy noted 

distally in the right hand and foot.  Muscle bulk is otherwise normal, there is 

no tenderness, no myotonia noted to percussion, and no fasciculations seen. 





Sensory examination is intact to pin and touch throughout all four limbs.  





Reflexes are 1/4 in the biceps, triceps, brachioradialis, quadriceps, and 

Achilles tendons bilaterally.





Toes are upgoing with plantar stimulation bilaterally.





Peripheral pulses are present and of normal quality distally in all four limbs.

  There is no peripheral edema noted.





Laboratory Results


Past 24 Hours:


17 05:48








17 05:48

















Test


  17


10:32 17


10:40 17


10:58 17


14:55


 


Urine Color ORANGE    


 


Urine Appearance


  SLIGHTLY


CLOUDY (CLEAR) 


  


  


 


 


Urine pH  (4.5-7.5)    


 


Urine Specific Gravity


  1.019


(1.000-1.030) 


  


  


 


 


Urine Protein NEG (NEG)    


 


Urine Glucose (UA)  (NEG)    


 


Urine Ketones  (NEG)    


 


Urine Occult Blood  (NEG)    


 


Urine Nitrite  (NEG)    


 


Urine Bilirubin  (NEG)    


 


Urine Urobilinogen  (NEG)    


 


Urine Leukocyte Esterase  (NEG)    


 


Urine RBC 0-4 /hpf (0-4)    


 


Urine WBC >30 /hpf (0-5)    


 


Urine Epithelial Cells


  10-20 /lpf


(0-5) 


  


  


 


 


Urine Bacteria 4+ (NEG)    


 


Immature Granulocyte % (Auto)  0.1 %   


 


White Blood Count


  


  10.33 K/uL


(4.8-10.8) 


  


 


 


Red Blood Count


  


  4.55 M/uL


(4.7-6.1) 


  


 


 


Hemoglobin


  


  14.4 g/dL


(14.0-18.0) 


  


 


 


Hematocrit  41.1 % (42-52)   


 


Mean Corpuscular Volume


  


  90.3 fL


() 


  


 


 


Mean Corpuscular Hemoglobin


  


  31.6 pg


(25-34) 


  


 


 


Mean Corpuscular Hemoglobin


Concent 


  35.0 g/dl


(32-36) 


  


 


 


Platelet Count


  


  156 K/uL


(130-400) 


  


 


 


Mean Platelet Volume


  


  10.1 fL


(7.4-10.4) 


  


 


 


Neutrophils (%) (Auto)  88.8 %   


 


Lymphocytes (%) (Auto)  6.5 %   


 


Monocytes (%) (Auto)  4.5 %   


 


Eosinophils (%) (Auto)  0.0 %   


 


Basophils (%) (Auto)  0.1 %   


 


Neutrophils # (Auto)


  


  9.18 K/uL


(1.4-6.5) 


  


 


 


Lymphocytes # (Auto)


  


  0.67 K/uL


(1.2-3.4) 


  


 


 


Monocytes # (Auto)


  


  0.46 K/uL


(0.11-0.59) 


  


 


 


Eosinophils # (Auto)


  


  0.00 K/uL


(0-0.5) 


  


 


 


Basophils # (Auto)


  


  0.01 K/uL


(0-0.2) 


  


 


 


Immature Granulocyte # (Auto)


  


  0.01 K/uL


(0.00-0.02) 


  


 


 


Erythrocyte Sedimentation Rate


  


  41 mm/hr


(0-14) 


  


 


 


Activated Partial


Thromboplast Time 


  52.7 SECONDS


(21.0-31.0) 


  


 


 


Partial Thromboplastin Ratio  2.0   


 


Phosphorus Level


  


  1.7 mg/dl


(2.5-4.9) 


  


 


 


Total Creatine Kinase


  


  224 U/L


() 


  


 


 


Creatine Kinase MB


  


  < 0.5 ng/ml


(0.5-3.6) 


  


 


 


Creatine Kinase MB Ratio   (0-3.0)   


 


Troponin I


  


  < 0.015 ng/ml


(0-0.045) 


  


 


 


C-Reactive Protein


  


  16.40 mg/dl


(0-0.29) 


  


 


 


Globulin


  


  4.3 gm/dl


(2.5-4.0) 


  


 


 


Albumin/Globulin Ratio  0.9 (0.9-2)   


 


Lipase


  


  170 U/L


() 


  


 


 


Thyroid Stimulating Hormone


(TSH) 


  0.924 uIu/ml


(0.300-4.500) 


  


 


 


Lyme Disease IgG Antibody  NEG (NEG)   


 


Lyme Disease IgM Antibody  NEG (NEG)   


 


Bedside Lactic Acid Venous


  


  


  1.32 mmol/L


(0.90-1.70) 


 


 


Vitamin B12 Level


  


  


  


  340 pg/mL


(211-911)


 


Folate


  


  


  


  13.67 ng/mL


(>5.38)














Test


  17


21:09 17


00:45 17


05:48 


 


 


Magnesium Level


  2.1 mg/dl


(1.8-2.4) 


  


  


 


 


Influenza Type A (RT-PCR)


  


  Neg for Influ


A (NEG) 


  


 


 


Influenza Type B (RT-PCR)


  


  Neg for Influ


B (NEG) 


  


 


 


Red Blood Count


  


  


  4.36 M/uL


(4.7-6.1) 


 


 


Mean Corpuscular Volume


  


  


  88.8 fL


() 


 


 


Mean Corpuscular Hemoglobin


  


  


  30.3 pg


(25-34) 


 


 


Mean Corpuscular Hemoglobin


Concent 


  


  34.1 g/dl


(32-36) 


 


 


RDW Standard Deviation


  


  


  41.0 fL


(36.4-46.3) 


 


 


RDW Coefficient of Variation


  


  


  12.5 %


(11.5-14.5) 


 


 


Mean Platelet Volume


  


  


  9.7 fL


(7.4-10.4) 


 


 


Prothrombin Time


  


  


  20.4 SECONDS


(9.0-12.0) 


 


 


Prothromb Time International


Ratio 


  


  1.9 (0.9-1.1) 


  


 


 


Anion Gap


  


  


  8.0 mmol/L


(3-11) 


 


 


Est Creatinine Clear Calc


Drug Dose 


  


  101.6 ml/min 


  


 


 


Estimated GFR (


American) 


  


  110.3 


  


 


 


Estimated GFR (Non-


American 


  


  95.1 


  


 


 


BUN/Creatinine Ratio   11.6 (10-20)  


 


Calcium Level


  


  


  8.3 mg/dl


(8.5-10.1) 


 


 


Total Bilirubin


  


  


  0.3 mg/dl


(0.2-1) 


 


 


Direct Bilirubin


  


  


  0.2 mg/dl


(0-0.2) 


 


 


Aspartate Amino Transf


(AST/SGOT) 


  


  56 U/L (15-37) 


  


 


 


Alanine Aminotransferase


(ALT/SGPT) 


  


  71 U/L (12-78) 


  


 


 


Alkaline Phosphatase


  


  


  213 U/L


() 


 


 


Total Protein


  


  


  6.6 gm/dl


(6.4-8.2) 


 


 


Albumin


  


  


  2.9 gm/dl


(3.4-5.0) 


 











Imaging


MRI OF THE BRAIN WITHOUT AND WITH IV CONTRAST





CLINICAL HISTORY: generalized weakness  URINARY RETENTION. LOWER EXTREMITY


WEAKNESS.





COMPARISON STUDY:  No previous studies for comparison. 





TECHNIQUE: MRI of the brain was performed from the vertex to the skull base


utilizing various T1 and T2 weighted sequences. Following the IV administration


of 9 mL of Gadavist contrast, additional enhanced images were obtained.





FINDINGS: 


Sagittal T1, axial diffusion, proton density and T2 weighted axial, coronal


FLAIR, and pre and post axial T1-weighted images were acquired. These were


supplemented with post gadolinium coronal T1 weighted images. 


No intra or extra-axial mass lesions are visualized.


Axial diffusion-weighted images reveal no evidence of acute or subacute


infarction.


There is no evidence of ventricular dilatation.


Proton density T2-weighted and FLAIR images reveal there are multiple foci of


increased T2 signal within the white matter. The distribution and orientation of


the lesions is suspicious for a demyelinating process such as multiple


sclerosis.


There are no abnormal flow voids.


There is no evidence of pathologic enhancement.





There is a 24 mm left occipital scalp mass, suspicious for a sebaceous cyst.





There is complete opacification of the left maxillary sinus. There is


opacification of the left frontal sinus.








IMPRESSION:  


1. Multiple foci of increased T2 signal within the periventricular deep white


matter, and subcortical U fibers. The distribution and orientation of the


lesions is suspicious for demyelinating process such as multiple sclerosis


2. No evidence of pathologic enhancement


3. No evidence of acute or subacute infarction


4. Inflammatory changes in left maxillary and frontal sinuses


5. Left occipital scalp mass likely representing a sebaceous cyst











Electronically signed by:  Antoni Andino M.D.


2017 5:10 PM





Dictated Date/Time:  2017 5:06 PM





Impression


1.  Multiple sclerosis





I would classify this patient has relapsing remitting with secondary 

progression.


He was doing fairly well until recently.  In all likelihood, he has had an 

acute flareup probably secondary to a spine lesion which has given him 

increased lower extremity weakness as well as urinary retention to a 

significant degree.  He also has a urinary tract infection which could make all 

of his symptoms worse.





Unfortunately, he is not on any disease modifying therapy.





2.  Chronic low back pain





3.  Significant urinary retention





Plan


1.  Recommend MRI of the cervical and thoracic spine with and without contrast.





This will help give a diagnosis and prognosis for spine disease which helps in 

his care, as I tend to be more aggressive with treatment for spinal cord MS.





2.  Continue IV Solu-Medrol 1 g a day for 3 days, followed by a tapering one 

week Medrol dosepak





3.  Aggressive physical and occupational therapy





Consider transfer to [facility] to accomplish this





4.  Treat urinary tract infection and sinusitis with antibiotics.





5.  Consider disease modifying therapy after discharge including oral Gilenya 

versus Tysabri, or other.





The patient follows with Dr. Alonso as an outpatient.


I spoke with  regarding this case including diagnoses and treatment 

options.  I will follow as long as he is in house.

## 2017-01-06 NOTE — HOSPITALIST PROGRESS NOTE
Hospitalist Progress Note


Date of Service


Jan 6, 2017.


 (Mily Tavera ., SRIRAM)





Subjective


Pt evaluation today including:  conversation w/ patient, physical exam, chart 

review, lab review, review of studies, review of inpatient medication list


Voiding:  rowell catheter in place


Patient states he is feeling well today. He believes his weakness has improved. 

He is eating and drinking OK. Patient denies any fever, chills, sweats, 

lightheadedness, dizziness, vision changes, CP, palpitations, edema, SOB, 

wheezing, cough, abdominal pain, nausea, vomiting, diarrhea, urinary symptoms, 

melena, numbness/tingling, muscle/joint pain, anxiety/depression, active 

bleeding, or new skin discoloration/changes. 


 (Mily Tavera ., PA-C)





Medications





 Current Inpatient Medications








 Medications


  (Trade)  Dose


 Ordered  Sig/Miguel


 Route  Start Time


 Stop Time Status Last Admin


Dose Admin


 


 Acetaminophen


  (Tylenol Tab)  650 mg  Q4H  PRN


 PO  1/5/17 13:00


 2/4/17 12:59  1/5/17 20:29


650 MG


 


 Al Hydrox/Mg


 Hydrox/Simethicone


  (Maalox Max Susp)  15 ml  Q4H  PRN


 PO  1/5/17 13:00


 2/4/17 12:59   


 


 


 Magnesium


 Hydroxide


  (Milk Of


 Magnesia Susp)  30 ml  Q6H  PRN


 PO  1/5/17 13:00


 2/4/17 12:59   


 


 


 Polyethylene


  (Miralax Powder


 Packet)  17 gm  DAILY  PRN


 PO  1/5/17 13:00


 2/4/17 12:59   


 


 


 Ondansetron HCl


  (Zofran Inj)  4 mg  Q6H  PRN


 IV  1/5/17 13:00


 2/4/17 12:59   


 


 


 Baclofen


  (Lioresal Tab)  10 mg  BID


 PO  1/5/17 20:00


 2/4/17 20:59  1/6/17 09:29


10 MG


 


 Warfarin Sodium


  (Coumadin Tab)  7.5 mg  Q2D@1600


 PO  1/6/17 16:00


 2/5/17 15:59   


 


 


 Warfarin Sodium


  (Coumadin Tab)  10 mg  Q2D@1600


 PO  1/5/17 16:00


 2/4/17 15:59  1/5/17 20:33


10 MG


 


 Gadobutrol 9 mmol  9 mmol  UD  PRN


 IV  1/5/17 16:45


 1/9/17 16:44   


 


 


 Ceftriaxone


 Sodium 2000 mg/


 Dextrose  70 ml @ 


 100 mls/hr  Q24H


 IV  1/5/17 22:00


 1/15/17 21:59  1/6/17 00:37


100 MLS/HR


 


 Methylprednisolone


 Sodium Succinate/


 Dextrose


  (Solu-Medrol IV/


 D5 250ml)  266 ml @ 


 250 mls/hr  DAILY


 IV  1/6/17 08:00


 1/8/17 10:00  1/6/17 09:29


250 MLS/HR








 (Mily Tavera, PA-C)





Objective


Vital Signs











  Date Time  Temp Pulse Resp B/P Pulse Ox O2 Delivery O2 Flow Rate FiO2


 


1/6/17 08:14 37.8 87 16 103/63 94 Room Air  


 


1/6/17 00:13 37.5 71 18 109/58 94 Room Air  


 


1/6/17 00:00      Room Air  


 


1/5/17 21:44 37.7       


 


1/5/17 20:30     95 Room Air  


 


1/5/17 20:00 38.2 86 20 133/76 95 Room Air  


 


1/5/17 19:06 37.1 81 18 136/77 96   


 


1/5/17 17:52  81 18 136/77 96 Room Air  


 


1/5/17 16:33  87 15 137/74 95 Room Air  


 


1/5/17 14:22      Room Air  


 


1/5/17 13:30  85 18 138/76 98 Room Air  


 


1/5/17 13:04  79      


 


1/5/17 11:04  74 18 126/75 97 Room Air  


 


1/5/17 10:21     96 Room Air  


 


1/5/17 09:47 37.1 83 18 128/85 97 Room Air  


 


1/5/17 09:47  83      








 (Mily Tavera, PA-C)





Physical Exam


General Appearance:  no apparent distress


Eyes:  normal inspection, PERRL


ENT:  hearing grossly normal


Neck:  supple


Respiratory/Chest:  lungs clear, no respiratory distress, no accessory muscle 

use


Cardiovascular:  regular rate, rhythm, no edema


Abdomen:  normal bowel sounds, non tender, soft


Extremities:  no pedal edema, no calf tenderness


Neurologic/Psychiatric:  alert, normal mood/affect, oriented x 3, + motor 

weakness (decreased right lower extremity strength. decreased right hand  )


Skin:  normal color, warm/dry, no rash


 (Mily Tavera, PA-C)





Laboratory Results





Last 24 Hours








Test


  1/5/17


10:32 1/5/17


10:40 1/5/17


10:58 1/5/17


14:55


 


Urine Color ORANGE    


 


Urine Appearance


  SLIGHTLY


CLOUDY 


  


  


 


 


Urine pH     


 


Urine Specific Gravity 1.019    


 


Urine Protein NEG    


 


Urine Glucose (UA)     


 


Urine Ketones     


 


Urine Occult Blood     


 


Urine Nitrite     


 


Urine Bilirubin     


 


Urine Urobilinogen     


 


Urine Leukocyte Esterase     


 


Urine RBC 0-4 /hpf    


 


Urine WBC >30 /hpf    


 


Urine Epithelial Cells 10-20 /lpf    


 


Urine Bacteria 4+    


 


White Blood Count  10.33 K/uL   


 


Red Blood Count  4.55 M/uL   


 


Hemoglobin  14.4 g/dL   


 


Hematocrit  41.1 %   


 


Mean Corpuscular Volume  90.3 fL   


 


Mean Corpuscular Hemoglobin  31.6 pg   


 


Mean Corpuscular Hemoglobin


Concent 


  35.0 g/dl 


  


  


 


 


Platelet Count  156 K/uL   


 


Mean Platelet Volume  10.1 fL   


 


Neutrophils (%) (Auto)  88.8 %   


 


Lymphocytes (%) (Auto)  6.5 %   


 


Monocytes (%) (Auto)  4.5 %   


 


Eosinophils (%) (Auto)  0.0 %   


 


Basophils (%) (Auto)  0.1 %   


 


Neutrophils # (Auto)  9.18 K/uL   


 


Lymphocytes # (Auto)  0.67 K/uL   


 


Monocytes # (Auto)  0.46 K/uL   


 


Eosinophils # (Auto)  0.00 K/uL   


 


Basophils # (Auto)  0.01 K/uL   


 


RDW Standard Deviation  42.4 fL   


 


RDW Coefficient of Variation  12.8 %   


 


Immature Granulocyte % (Auto)  0.1 %   


 


Immature Granulocyte # (Auto)  0.01 K/uL   


 


Erythrocyte Sedimentation Rate  41 mm/hr   


 


Prothrombin Time  24.7 SECONDS   


 


Prothromb Time International


Ratio 


  2.2 


  


  


 


 


Activated Partial


Thromboplast Time 


  52.7 SECONDS 


  


  


 


 


Partial Thromboplastin Ratio  2.0   


 


Sodium Level  137 mmol/L   


 


Potassium Level  4.1 mmol/L   


 


Chloride Level  103 mmol/L   


 


Carbon Dioxide Level  26 mmol/L   


 


Anion Gap  8.0 mmol/L   


 


Blood Urea Nitrogen  12 mg/dl   


 


Creatinine  1.10 mg/dl   


 


Est Creatinine Clear Calc


Drug Dose 


  80.4 ml/min 


  


  


 


 


Estimated GFR (


American) 


  85.3 


  


  


 


 


Estimated GFR (Non-


American 


  73.6 


  


  


 


 


BUN/Creatinine Ratio  10.6   


 


Random Glucose  99 mg/dl   


 


Calcium Level  8.9 mg/dl   


 


Phosphorus Level  1.7 mg/dl   


 


Magnesium Level  2.5 mg/dl   


 


Total Bilirubin  0.6 mg/dl   


 


Aspartate Amino Transf


(AST/SGOT) 


  52 U/L 


  


  


 


 


Alanine Aminotransferase


(ALT/SGPT) 


  91 U/L 


  


  


 


 


Alkaline Phosphatase  206 U/L   


 


Total Creatine Kinase  224 U/L   


 


Creatine Kinase MB  < 0.5 ng/ml   


 


Creatine Kinase MB Ratio     


 


Troponin I  < 0.015 ng/ml   


 


C-Reactive Protein  16.40 mg/dl   


 


Total Protein  8.0 gm/dl   


 


Albumin  3.7 gm/dl   


 


Globulin  4.3 gm/dl   


 


Albumin/Globulin Ratio  0.9   


 


Lipase  170 U/L   


 


Thyroid Stimulating Hormone


(TSH) 


  0.924 uIu/ml 


  


  


 


 


Lyme Disease IgG Antibody  NEG   


 


Lyme Disease IgM Antibody  NEG   


 


Bedside Lactic Acid Venous   1.32 mmol/L  


 


Vitamin B12 Level    340 pg/mL 


 


Folate    13.67 ng/mL 














Test


  1/5/17


21:09 1/6/17


00:45 1/6/17


05:48 


 


 


Sodium Level 136 mmol/L   137 mmol/L  


 


Potassium Level 4.0 mmol/L   4.0 mmol/L  


 


Chloride Level 101 mmol/L   104 mmol/L  


 


Carbon Dioxide Level 24 mmol/L   25 mmol/L  


 


Anion Gap 11.0 mmol/L   8.0 mmol/L  


 


Blood Urea Nitrogen 13 mg/dl   10 mg/dl  


 


Creatinine 1.10 mg/dl   0.87 mg/dl  


 


Est Creatinine Clear Calc


Drug Dose 80.4 ml/min 


  


  101.6 ml/min 


  


 


 


Estimated GFR (


American) 85.3 


  


  110.3 


  


 


 


Estimated GFR (Non-


American 73.6 


  


  95.1 


  


 


 


BUN/Creatinine Ratio 12.1   11.6  


 


Random Glucose 129 mg/dl   115 mg/dl  


 


Calcium Level 8.1 mg/dl   8.3 mg/dl  


 


Magnesium Level 2.1 mg/dl    


 


Influenza Type A (RT-PCR)


  


  Neg for Influ


A 


  


 


 


Influenza Type B (RT-PCR)


  


  Neg for Influ


B 


  


 


 


White Blood Count   6.05 K/uL  


 


Red Blood Count   4.36 M/uL  


 


Hemoglobin   13.2 g/dL  


 


Hematocrit   38.7 %  


 


Mean Corpuscular Volume   88.8 fL  


 


Mean Corpuscular Hemoglobin   30.3 pg  


 


Mean Corpuscular Hemoglobin


Concent 


  


  34.1 g/dl 


  


 


 


RDW Standard Deviation   41.0 fL  


 


RDW Coefficient of Variation   12.5 %  


 


Platelet Count   147 K/uL  


 


Mean Platelet Volume   9.7 fL  


 


Prothrombin Time   20.4 SECONDS  


 


Prothromb Time International


Ratio 


  


  1.9 


  


 


 


Total Bilirubin   0.3 mg/dl  


 


Direct Bilirubin   0.2 mg/dl  


 


Aspartate Amino Transf


(AST/SGOT) 


  


  56 U/L 


  


 


 


Alanine Aminotransferase


(ALT/SGPT) 


  


  71 U/L 


  


 


 


Alkaline Phosphatase   213 U/L  


 


Total Protein   6.6 gm/dl  


 


Albumin   2.9 gm/dl  








 (Mily Tavera, SRIRAM)





Assessment and Plan


59 y/o male, with PMHx of MS and DVT, who presented to the ED because of 

generalized weakness x4 days.





MS flare with generalized weakness, likely secondary to acute illness:


-Admit med/surg


-Consult neurology, appreciate recommendations


   --Recommend MRI of cervical and thoracic spine 


   --IV SoluMedrol 1 gm x 3 days initiated after ~1 day treatment of IV 

Rocephin (SoluMedrol started on 1/6), then 1 week Medrol dosepak renu 


   --?disease modifying therapy after discharge 


-MRI combo of brain- Multiple foci of increased T2 signal within the 

periventricular deep white matter, and subcortical U fibers. The distribution 

and orientation of the lesions is suspicious for demyelinating process such as 

multiple sclerosis. No evidence of pathologic enhancement. No evidence of acute 

or subacute infarction. Inflammatory changes in left maxillary and frontal 

sinuses. Left occipital scalp mass likely representing a sebaceous cyst


-Continue Baclofen 10 mg PO BID 


-Pending blood cultures


-Follow CBC and CMP 


-Check b12, folate, and TSH


-Check lyme and RPR serology


-Rapid flu negative    


-Consult PT/OT





Urinary retention:


-U/A dirty, pending urine culture 


   --IV Rocephin (started 1/5)


-Check MRI of lumbar spine- Multilevel spondylitic changes. Mild spinal 

stenosis at the L2-3 level, and moderate spinal stenosis at the L3-4 level. On 

the sagittal images, there is an equivocal thin linear focus of increased T2 

signal within the distal thoracic cord at the T11-12 level. No evidence of 

pathologic enhancement. No evidence of pathologic marrow replacement. 


-Check renal U/S- No renal abnormalities identified. Bladder wall thickening. 


-Consulted urology, appreciate recommendations. Patient has voided +9.3L of 

urine since in 23 hours. ?need for self catheterizations 


   --Start Tamsulosin 0.4 mg PO QAM 


   --Recommend leaving Rowell for 5-7 days for bladder rest and then attempt TOV

( may be done in office). If fails, will need self catheterization


   --NATHALY and PSA check in the future as outpatient. Also recommend UDS testing 

to evaluate bladder function - this also will be done as outpatient in office 


   --Recommend 2 weeks of oral antibiotics 





Hypophosphatemia:


-Phosphorus of 1.7 on admission


-Replace with phosphate supplement 1 tab PO QID. Continue to monitor  





Sinusitis seen on brain MRI:


-Covered with IV Rocephin 





Elevated LFTs:


-Stable. Continue to monitor 


-Check liver U/S- normal biliary U/S





hx of DVT:


-Continue Coumadin 10 mg PO QOD and 7.5 mg PO QOD


-Follow INR/PT





DVT prophylaxis:


-Continue Coumadin 


-ROSLYN and SCDs





GI Prophylaxis:


-Maalox PRN


-IV Zofran PRN


-Colace and/or Milk of Mag PRN





Code Status:


-LEVEL I, FULL 





Dispo:


-Need PT/OT evaluations


-Discharge to ?home/rehab once medically stable  


 (Mily Tavera, PAYessicaC)





Reviewed:  Pt Seen/Exam by Me, HO Notes, Labs, RAD


 (Mary Ellen Wallace MD)


History


Agree with above PA HPI/ROS. Pt declined MRI C and T-spine today because he 

thought it wouldn't make a difference inhis care. I explained that Dr. Villalobos said 

he would be more aggressive with treatment if he DID have spinal cord disease 

and pt then said yes he did remember Dr. Villalobos saying that and now he will have 

the MRIs done. He was a little anxious in MRI machine for so long but does not 

want anything for anxiety. 


Definitely stronger today already with treating his UTI and sinusitis. Walked 

around the [place] with a walker


 (Mary Ellen Wallace MD)


Constitutional:  acknowledges: fever (mild this AM) (Mary Ellen Wallace MD)


General Appearance:  WD/WN, no apparent distress


Eye Exam:  bilateral eye normal inspection


Respiratory:  lungs clear, normal breath sounds, no respiratory distress, no 

accessory muscle use


Cardiovascular:  regular rate, rhythm, no edema, no gallop, no murmur


Gastrointestinal:  normal bowel sounds, non tender, soft


Neurologic/Psychiatric:  alert, oriented x 3, other (anxious)


Skin Characteristics:  normal color


 (Mary Ellen Wallace MD)


Assessment/Plan


Agree with above PA A/P as above with the following exceptions/additions:





Fever, UTI, Severe urinary retention, Acute Sinusitis on MRI brain, MS flare 

secondary to acute illness, spinal cord disease -->


-continue to treat for acute sinusitis and UTI with Rocephin 2 gram IV q24


-follow Urcx with GNR, ID pending,follow BCxs


-Spoke with Dr. Villalobos today-->continue IV Solu Medrol qday x 3 days and then 

taper down as above


-pt now agreeable to MRI C and T-spine for tomorrow


-PT/OT consults





Urinary retention/brisk diuresis of 8L urine in 13 hours, neurogenic bladder-


-continue Rowell catheter for now and voiding trial after MS flare improves-Urol 

recommends at least 7 days and then TOV in their office


-will need urodynamics


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


-may need to be taught CIC





Proph-on coumadin, follow INR 1.9 today


 (Mary Ellen Wallace MD)

## 2017-01-06 NOTE — UROLOGY CONSULTATION
History


General


Date of Service:


Jan 6, 2017.


Primary Care Physician:


Mike Moran M.D.


Pt seen a urologist before?:  No





History of Present Illness


58 year old male with hx of MS admitted with general weakness, falls. 


He was able to ambulate with walker in the past.  


Urology consulted for urinary retention. Gutierrez was placed in the ER for urine 

return of 4L. 


Pt reports he has never had bladder issues, problems voiding in the past. 


No hx of UTIs. 


Renal u/s reviewed and shows bladder wall thickening. No hydronephrosis


AFVSS. White count and creatinine normal. 


Blood cultures are pending. 


Preliminary urine culture with gm neg bacilli.


On IV Ceftriaxone


Also on warfarin for hx of DVTs. 


Lumbar MRI shows lumbar spinal stenosis worse in L3-L4 along with disc bulge.





Laboratory


Labs were reviewed and are within normal limits unless listed below. Labs are 

available in the chart and at [facility]





Problem List


Medical Problems:


(1) Exacerbation of multiple sclerosis


Status: Acute  





(2) Lower extremity weakness


Status: Acute  





(3) Urinary retention


Status: Acute  





(4) UTI (urinary tract infection)


Status: Acute  











Past History


deep vein thrombosis, other (MS)





Family History





Cancer





Social History


Marital status:  


Occupation status:  employed





Immunizations


History of Influenza Vaccine:  Yes


History of Tetanus Vaccine?:  Unknown


History of Pneumococcal:  Unknown


History of Hepatitis B Vaccine:  Unknown





Allergies


Coded Allergies:  


     No Known Allergies (Verified , 11/24/08)





Medications


Home Medications:





Home Meds and Scripts








 Medications  Dose


 Route/Sig


 Max Daily Dose Days Date Category


 


 Viagra


  (Sildenafil


 Citrate) 100 Mg


 Tab  1 Tab


 PO DAILY PRN


    1/19/16 Reported


 


 Centrum


 Multivitamin Flav


  (Multiple


 Vitamins W/


 Minerals) 1 Pow


 Pow  1 Tab


 PO DAILY


    1/19/16 Reported


 


 Jantoven


  (Warfarin Sodium)


 7.5 Mg Tab  7.5 Mg


 PO ALTERNATE


    1/19/16 Reported


 


 Jantoven


  (Warfarin Sodium)


 10 Mg Tab  10 Mg


 PO ALTERNATE


    1/19/16 Reported


 


 Baclofen *


  (Baclofen) 10 Mg


 Tab  10 Mg


 PO BID


    11/24/08 Reported








Inpatient Medications:





 Current Inpatient Medications








 Medications


  (Trade)  Dose


 Ordered  Sig/Miguel


 Route  Start Time


 Stop Time Status Last Admin


Dose Admin


 


 Acetaminophen


  (Tylenol Tab)  650 mg  Q4H  PRN


 PO  1/5/17 13:00


 2/4/17 12:59  1/5/17 20:29


650 MG


 


 Al Hydrox/Mg


 Hydrox/Simethicone


  (Maalox Max Susp)  15 ml  Q4H  PRN


 PO  1/5/17 13:00


 2/4/17 12:59   


 


 


 Magnesium


 Hydroxide


  (Milk Of


 Magnesia Susp)  30 ml  Q6H  PRN


 PO  1/5/17 13:00


 2/4/17 12:59   


 


 


 Polyethylene


  (Miralax Powder


 Packet)  17 gm  DAILY  PRN


 PO  1/5/17 13:00


 2/4/17 12:59   


 


 


 Ondansetron HCl


  (Zofran Inj)  4 mg  Q6H  PRN


 IV  1/5/17 13:00


 2/4/17 12:59   


 


 


 Baclofen


  (Lioresal Tab)  10 mg  BID


 PO  1/5/17 20:00


 2/4/17 20:59  1/6/17 09:29


10 MG


 


 Warfarin Sodium


  (Coumadin Tab)  7.5 mg  Q2D@1600


 PO  1/6/17 16:00


 2/5/17 15:59   


 


 


 Warfarin Sodium


  (Coumadin Tab)  10 mg  Q2D@1600


 PO  1/5/17 16:00


 2/4/17 15:59  1/5/17 20:33


10 MG


 


 Gadobutrol 9 mmol  9 mmol  UD  PRN


 IV  1/5/17 16:45


 1/9/17 16:44   


 


 


 Ceftriaxone


 Sodium 2000 mg/


 Dextrose  70 ml @ 


 100 mls/hr  Q24H


 IV  1/5/17 22:00


 1/15/17 21:59  1/6/17 00:37


100 MLS/HR


 


 Methylprednisolone


 Sodium Succinate/


 Dextrose


  (Solu-Medrol IV/


 D5 250ml)  266 ml @ 


 250 mls/hr  DAILY


 IV  1/6/17 08:00


 1/8/17 10:00  1/6/17 09:29


250 MLS/HR


 


 Potassium/


 Phosphorus/Sodium


  (Phospha 250


 Neutral


 155-852-130 Mg)  1 tab  QID


 PO  1/6/17 12:00


 2/5/17 11:59   


 











Review of Systems


Review of Systems


Constitutional:  No fever


Eyes:  No blurred vision


Neurological:  No dizzy


Endocrine:  No excessive thirst


Gastrointestinal:  No abdominal pain


Cardiovascular:  No chest pain


Respiratory:  No shortness of breath


Blood / Lymphatic:  + bleed easily (on warfarin)


Ears / Nose / Throat:  No hearing loss


Male :  + see HPI





Physical Exam


Vital Signs:





 Vital Signs Past 12 Hours








  Date Time  Temp Pulse Resp B/P Pulse Ox O2 Delivery O2 Flow Rate FiO2


 


1/6/17 08:14 37.8 87 16 103/63 94 Room Air  


 


1/6/17 00:13 37.5 71 18 109/58 94 Room Air  


 


1/6/17 00:00      Room Air  








Physical Exam:


General Appearance:  WD/WN, no apparent distress


ENT:  hearing grossly normal


Neck:  no JVD


Respiratory/Chest:  no respiratory distress, no accessory muscle use


Neurologic/Psychiatric:  alert, normal mood/affect, oriented x 3


Skin:  normal color, warm/dry





Assessment & Plan


Assessment & Plan


Imaging:  Ultrasound





Urinary retention


Suspect neurogenic bladder due to MS. 4L of fluid drained upon gutierrez insertion 


MRI of lumbar spine shows bulging discs and spinal stenosis.- could be 

contributing to bladder function. 


He denies previous bothersome urinary issues.


Will trial tamsulosin daily- he is over 50 and could possibly be related to an 

enlarged prostate. Deferred NATHALY today. 


Recommend leaving gutierrez for 5-7 days for bladder rest and then attempt TOV( may 

be done in our office). If he fails will need to learn CIC (clean intermittent 

catheterization).


Will monitor for hematuria- he is on warfarin and gutierrez may cause hematuria. 


His discharge plans are uncertain at this time. May need rehab facility for PT/

OT strengthening. 


He will need NATHALY and PSA check in the future as outpt. Also recommend UDS 

testing to evaluate bladder function - this also will be done as outpt in our 

office. 


Urine culture pending. Recommend 2 weeks of oral antibiotics per sensitivities. 


Thanks for the consult. Will continue to follow along with primary service. 


 Agree with above plan

## 2017-01-06 NOTE — MEDICAL STUDENT: [facility]
Med Student History & Physical


Date & Time of Service:


Jan 6, 2017 at 08:41


Chief Complaint:


Exacerbation Of Multiple Sclerosis, Uti


Primary Care Physician:


Mike Moran M.D.


History of Present Illness


Source:  patient, clinic records, hospital records


Mike Blank is a 58 year old male with a 33 year history of multiple 

sclerosis presenting to the ER on 1/5 with 3 days of debilitating fatigue and 

weakness. He noted that he was having an increase in his chronic lower back 

pain during this time. He had one episode where he had a sudden urge to urinate 

and as he got up to walk to the bathroom he collapsed and was unable to get up. 

He noticed fever and chills over the past three days and had one episode of 

losing control of his bladder. He also noticed cloudy, smelly urine. He decided 

to come to the ED after talking to his PCP, Dr. Moran. 





In the ED he was catheterized which yielded 4L immediately of dark urine, and 8 

L in 13 hours. A UA came back with 4+ bacteria and increased WBC to confirm 

UTI. Brain MRI was performed showing multiple focal white matter lesions in 

pattern consistent with MS, but no acute or active lesions. The MRI of the head 

also showed sinus inflammation suggestive of sinusitis. A spinal MRI was also 

performed showing a white matter lesion in the spinal cord at the level of T11. 

Renal ultrasound showed bladder wall thickening. He was given IM Rocephin 

followed by IV ceftriaxone. 





Mr. Blank's last neurology visit was in 3/2015 with Dr. Alonso. At that time 

he was no longer taking any disease modifying therapies due to a lack of 

perceived improvement. He had tried Ampyra, Avonex, Copaxone, and naltrexone in 

the past. He is thought to have a primary progressive form of MS that has 

gradually worsened over time. He had optic neuritis in both eyes early on in 

his disease, but this did not cause any chronic issues. He complains of 

frequent numbness and weakness in his fingers especially 3rd and fourth fingers 

on the right hand. He notes a loss of dexterity in both hands. He also has had 

severe weakness in his right lower extremity and foot drop. He uses a cane and, 

at times, a walker to ambulate.  He frequently has a feeling of incompletely 

emptying his bladder, as well as difficulty initiating urination, but has never 

had to be catheterized for this. He also reports occasion ringing in his ears. 





Today he feels some improvement. He continues to feel very weak, and has not 

yet attempted to get out of bed. He continues to feel febrile, and has some 

sinus pressure and congestion. Does say that he feels stronger than yesterday. 

He denies any frequent headaches, abdominal discomfort, acute changes in vision

, difficulty hearing, difficulty with taste, or difficulty with smelling.   





He is currently taking Baclofen for back pain and warfarin for bilateral ankle 

DVT's.





Past Medical/Surgical History


Medical Problems:


(1) Exacerbation of multiple sclerosis


Status: Acute  





(2) Lower extremity weakness


Status: Acute  





(3) Urinary retention


Status: Acute  





(4) UTI (urinary tract infection)


Status: Acute  








Family History


Father: CLL


Mother: Pancreatic cancer





Social History


Smoking Status:  Never Smoker


Smokeless Tobacco Use:  No


Alcohol Use:  occasionally (10 beers a week)


Drug Use:  other (Caffeine)


Marital Status:  


Occupational Status:  employed





Immunizations


History of Influenza Vaccine:  Yes


History of Tetanus Vaccine?:  Unknown


History of Pneumococcal:  Unknown


History of Hepatitis B Vaccine:  Unknown





Allergies


Coded Allergies:  


     No Known Allergies (Verified , 11/24/08)





Medications


Baclofen (Baclofen *), 10 MG PO BID


Multiple Vitamins W/ Minerals (Centrum Multivitamin Flav), 1 TAB PO DAILY


Sildenafil Citrate (Viagra), 1 TAB PO DAILY PRN for PRN


Warfarin Sod (Jantoven), 10 MG PO alternate


Warfarin Sod (Jantoven), 7.5 MG PO alternate





Review of Systems


As described in HPI, otherwise negative





Physical Exam


Vital Signs (24 Hours)











  Date Time  Temp Pulse Resp B/P Pulse Ox O2 Delivery O2 Flow Rate FiO2


 


1/6/17 08:14 37.8 87 16 103/63 94 Room Air  


 


1/6/17 00:13 37.5 71 18 109/58 94 Room Air  


 


1/6/17 00:00      Room Air  


 


1/5/17 21:44 37.7       


 


1/5/17 20:30     95 Room Air  


 


1/5/17 20:00 38.2 86 20 133/76 95 Room Air  


 


1/5/17 19:06 37.1 81 18 136/77 96   


 


1/5/17 17:52  81 18 136/77 96 Room Air  


 


1/5/17 16:33  87 15 137/74 95 Room Air  


 


1/5/17 14:22      Room Air  


 


1/5/17 13:30  85 18 138/76 98 Room Air  


 


1/5/17 13:04  79      


 


1/5/17 11:04  74 18 126/75 97 Room Air  


 


1/5/17 10:21     96 Room Air  


 


1/5/17 09:47 37.1 83 18 128/85 97 Room Air  


 


1/5/17 09:47  83      








General Appearance:  WD/WN, no apparent distress


Eyes:  normal inspection, PERRL, EOMI


Abdomen/GI:  non tender, soft, no organomegaly


Neurologic/Psych:  normal mood/affect, oriented x 3


Mental status: oriented to person, place, and time. Normal affect. Memory 

intact. No aphasia. Cognitively appropriate.





Cranial nerves:


CN1: not assessed


CN2: intact, PERRL, visual acuity 20/50 bilaterally, uses corrective lenses.


CN3,4,6: intact, EOMI, PERRL 


CN5: intact sensation in three regions bilaterally.


CN7: intact, facial expressions appropriate and able to resist eye opening.


CN8: intact, no difficulty hearing bilaterally.


CN9,10: intact, normal palate elevation


CN11: intact: shoulder shrug bilaterally 5/5 strength.


CN12: intact: tongue movement intact. 





Motor: 


Strength: shoulders 5/5 bilaterally, biceps 5/5 bilaterally, triceps 5/5 

bilaterally, finger  5/5 on left, 4/5 on right. finger abduction 1/5 

between 3rd and 4th fingers on the right. Hip flexion 1/5 on the right, 5/5 on 

the left, knee extension 3/5 on the right, 5/5 on the left. Dorsiflexion 0/5 on 

the right, 5/5 on the left. Plantar flexion 1/5 on the right, 5/5 on the left.


Reflexes: areflexic bilaterally in biceps, brachioradialis, patellas, and 

ankles. Babinski reflexes are upgoing bilaterally.


Atrophy appreciated in right leg. Tone appears normal bilaterally.





Sensation: Sensation to light touch and vibration intact bilaterally throughout 

upper and lower extremities, including right 3rd and 4th fingers where he 

complains of frequent paresthesias and numbness. 





Cerebellar: unable to appreciate gait. Difficulty with finger-nose test with 

right hand. Slight drift in right arm laterally and down when performing 

supinated straight are raise with eyes closed.





Diagnostics


Laboratory Results





Results Past 24 Hours








Test


  1/5/17


10:32 1/5/17


10:40 1/5/17


10:58 1/5/17


14:55 Range/Units


 


 


Urine Color ORANGE     


 


Urine Appearance SLIGHTLY CLOUDY    CLEAR  


 


Urine pH     4.5-7.5  


 


Urine Specific Gravity 1.019    1.000-1.030  


 


Urine Protein NEG    NEG  


 


Urine Glucose (UA)     NEG  


 


Urine Ketones     NEG  


 


Urine Occult Blood     NEG  


 


Urine Nitrite     NEG  


 


Urine Bilirubin     NEG  


 


Urine Urobilinogen     NEG  


 


Urine Leukocyte Esterase     NEG  


 


Urine RBC 0-4    0-4  /hpf


 


Urine WBC >30    0-5  /hpf


 


Urine Epithelial Cells 10-20    0-5  /lpf


 


Urine Bacteria 4+    NEG  


 


White Blood Count  10.33   4.8-10.8  K/uL


 


Red Blood Count  4.55   4.7-6.1  M/uL


 


Hemoglobin  14.4   14.0-18.0  g/dL


 


Hematocrit  41.1   42-52  %


 


Mean Corpuscular Volume  90.3     fL


 


Mean Corpuscular Hemoglobin  31.6   25-34  pg


 


Mean Corpuscular Hemoglobin


Concent 


  35.0


  


  


  32-36  g/dl


 


 


Platelet Count  156   130-400  K/uL


 


Mean Platelet Volume  10.1   7.4-10.4  fL


 


Neutrophils (%) (Auto)  88.8    %


 


Lymphocytes (%) (Auto)  6.5    %


 


Monocytes (%) (Auto)  4.5    %


 


Eosinophils (%) (Auto)  0.0    %


 


Basophils (%) (Auto)  0.1    %


 


Neutrophils # (Auto)  9.18   1.4-6.5  K/uL


 


Lymphocytes # (Auto)  0.67   1.2-3.4  K/uL


 


Monocytes # (Auto)  0.46   0.11-0.59  K/uL


 


Eosinophils # (Auto)  0.00   0-0.5  K/uL


 


Basophils # (Auto)  0.01   0-0.2  K/uL


 


RDW Standard Deviation  42.4   36.4-46.3  fL


 


RDW Coefficient of Variation  12.8   11.5-14.5  %


 


Immature Granulocyte % (Auto)  0.1    %


 


Immature Granulocyte # (Auto)  0.01   0.00-0.02  K/uL


 


Erythrocyte Sedimentation Rate  41   0-14  mm/hr


 


Prothrombin Time


  


  24.7


  


  


  9.0-12.0


SECONDS


 


Prothromb Time International


Ratio 


  2.2


  


  


  0.9-1.1  


 


 


Activated Partial


Thromboplast Time 


  52.7


  


  


  21.0-31.0


SECONDS


 


Partial Thromboplastin Ratio  2.0    


 


Sodium Level  137   136-145  mmol/L


 


Potassium Level  4.1   3.5-5.1  mmol/L


 


Chloride Level  103     mmol/L


 


Carbon Dioxide Level  26   21-32  mmol/L


 


Anion Gap  8.0   3-11  mmol/L


 


Blood Urea Nitrogen  12   7-18  mg/dl


 


Creatinine


  


  1.10


  


  


  0.60-1.40


mg/dl


 


Est Creatinine Clear Calc


Drug Dose 


  80.4


  


  


   ml/min


 


 


Estimated GFR (


American) 


  85.3


  


  


   


 


 


Estimated GFR (Non-


American 


  73.6


  


  


   


 


 


BUN/Creatinine Ratio  10.6   10-20  


 


Random Glucose  99   70-99  mg/dl


 


Calcium Level  8.9   8.5-10.1  mg/dl


 


Phosphorus Level  1.7   2.5-4.9  mg/dl


 


Magnesium Level  2.5   1.8-2.4  mg/dl


 


Total Bilirubin  0.6   0.2-1  mg/dl


 


Aspartate Amino Transf


(AST/SGOT) 


  52


  


  


  15-37  U/L


 


 


Alanine Aminotransferase


(ALT/SGPT) 


  91


  


  


  12-78  U/L


 


 


Alkaline Phosphatase  206     U/L


 


Total Creatine Kinase  224     U/L


 


Creatine Kinase MB  < 0.5   0.5-3.6  ng/ml


 


Creatine Kinase MB Ratio     0-3.0  


 


Troponin I  < 0.015   0-0.045  ng/ml


 


C-Reactive Protein  16.40   0-0.29  mg/dl


 


Total Protein  8.0   6.4-8.2  gm/dl


 


Albumin  3.7   3.4-5.0  gm/dl


 


Globulin  4.3   2.5-4.0  gm/dl


 


Albumin/Globulin Ratio  0.9   0.9-2  


 


Lipase  170     U/L


 


Thyroid Stimulating Hormone


(TSH) 


  0.924


  


  


  0.300-4.500


uIu/ml


 


Lyme Disease IgG Antibody  NEG   NEG  


 


Lyme Disease IgM Antibody  NEG   NEG  


 


Bedside Lactic Acid Venous


  


  


  1.32


  


  0.90-1.70


mmol/L


 


Vitamin B12 Level    340 211-911  pg/mL


 


Folate    13.67 >5.38  ng/mL














Test


  1/5/17


21:09 1/6/17


00:45 1/6/17


05:48 


  Range/Units


 


 


Sodium Level 136  137  136-145  mmol/L


 


Potassium Level 4.0  4.0  3.5-5.1  mmol/L


 


Chloride Level 101  104    mmol/L


 


Carbon Dioxide Level 24  25  21-32  mmol/L


 


Anion Gap 11.0  8.0  3-11  mmol/L


 


Blood Urea Nitrogen 13  10  7-18  mg/dl


 


Creatinine


  1.10


  


  0.87


  


  0.60-1.40


mg/dl


 


Est Creatinine Clear Calc


Drug Dose 80.4


  


  101.6


  


   ml/min


 


 


Estimated GFR (


American) 85.3


  


  110.3


  


   


 


 


Estimated GFR (Non-


American 73.6


  


  95.1


  


   


 


 


BUN/Creatinine Ratio 12.1  11.6  10-20  


 


Random Glucose 129  115  70-99  mg/dl


 


Calcium Level 8.1  8.3  8.5-10.1  mg/dl


 


Magnesium Level 2.1    1.8-2.4  mg/dl


 


Influenza Type A (RT-PCR)  Neg for Influ A   NEG  


 


Influenza Type B (RT-PCR)  Neg for Influ B   NEG  


 


White Blood Count   6.05  4.8-10.8  K/uL


 


Red Blood Count   4.36  4.7-6.1  M/uL


 


Hemoglobin   13.2  14.0-18.0  g/dL


 


Hematocrit   38.7  42-52  %


 


Mean Corpuscular Volume   88.8    fL


 


Mean Corpuscular Hemoglobin   30.3  25-34  pg


 


Mean Corpuscular Hemoglobin


Concent 


  


  34.1


  


  32-36  g/dl


 


 


RDW Standard Deviation   41.0  36.4-46.3  fL


 


RDW Coefficient of Variation   12.5  11.5-14.5  %


 


Platelet Count   147  130-400  K/uL


 


Mean Platelet Volume   9.7  7.4-10.4  fL


 


Prothrombin Time


  


  


  20.4


  


  9.0-12.0


SECONDS


 


Prothromb Time International


Ratio 


  


  1.9


  


  0.9-1.1  


 








 Microbiology Results


1/5/17 Blood Culture, Received


         Pending


1/5/17 Blood Culture, Received


         Pending


1/5/17 Urine Culture, Received


         Pending





Impression


Assessment and Plan


Assessment:


This is a 58-year-old man with a 33 year history of primary progressive 

multiple sclerosis presenting to the ED with 3 days of extreme fatigue, weakness

, fever, and urinary retention. Symptoms are likely due to UTI caused by 

neurogenic bladder symptoms secondary to MS. MRI of lumbar spine revealed white 

matter lesion in the T11 region of the spinal cord which correlates to the 

lower extremity weakness and urinary symptoms. Retained urine likely 

predisposed the patient to urinary tract infection. Alternative diagnosis of 

stoke unlikely from history, although the patient has unilateral weakness, this 

is an acute exacerbation of a chronic problem. MRI of brain showed no evidence 

of acute injury.





Plan:





Acute weakness and fatigue: Due to worsened multiple sclerosis symptoms 

exacerbated by urinary tract infection. IV methylprednisolone started. PT/OT 

consult. Consider discharge to [facility] for rehabilitation. 





Multiple Sclerosis: Outpatient with Dr. Alonso. Was previously hesitant to 

continue with follow up because of lack of perceived improvement on injectable 

medications, and feeling stability of disease. Appears more interested in 

seeking other options in treatment at this point. Consider cervical and 

thoracic spine MRI to assess likely demyelination in the spinal cord to guide 

treatment recommendations. May need to self-catheterize in the future, urinary 

retention will be followed after Ascencio is removed.





UTI: urine culture shows gram negative bacilli, likely E.coli. Continue IV 

ceftriaxone and Ascencio catheter. Urology has been consulted.





Advanced Directives


Existing Advance Directive:  No


Existing Living Will:  No


Existing Power of :  No

## 2017-01-07 NOTE — HOSPITALIST PROGRESS NOTE
Hospitalist Progress Note


Date of Service


Jan 7, 2017.





Subjective


Pt evaluation today including:  conversation w/ patient, conversation w/ family

, physical exam, chart review, lab review, review of studies, review of 

inpatient medication list


PO Intake:  Richard po


Voiding:  gutierrez catheter in place


Richard po, feeling stronger, walked halls today, right hand  strength stronger 

and he is pleased. Reviewed results of MRIs with him.





   Constitutional:  No fever


   Respiratory:  No shortness of breath


   Cardiovascular:  No chest pain


   Skin:  No rash





Objective


Vital Signs











  Date Time  Temp Pulse Resp B/P Pulse Ox O2 Delivery O2 Flow Rate FiO2


 


1/7/17 15:44 36.6 74 18 127/81 95 Room Air  


 


1/7/17 08:43      Room Air  


 


1/7/17 07:56 36.7 78 18 130/72 96   


 


1/6/17 23:33 36.5 71 18 135/87 97 Room Air  


 


1/6/17 20:00      Room Air  











Physical Exam


General Appearance:  WD/WN, no apparent distress


Eyes:  normal inspection, sclerae normal


Respiratory/Chest:  lungs clear, normal breath sounds, no respiratory distress, 

no accessory muscle use


Cardiovascular:  regular rate, rhythm, no edema, no murmur


Abdomen:  normal bowel sounds, non tender, soft, + pertinent finding (Gutierrez in 

place draining pink tinged clear urine)


Extremities:  + swelling (trace edema legs bilat)


Neurologic/Psychiatric:  + pertinent finding (strength in right upper ext 4/5 

throughout,much improved, RLE still 3-4/5)


Skin:  normal color, warm/dry, no rash





Laboratory Results





Last 24 Hours








Test


  1/7/17


05:27


 


White Blood Count 6.58 K/uL 


 


Red Blood Count 4.35 M/uL 


 


Hemoglobin 13.5 g/dL 


 


Hematocrit 37.7 % 


 


Mean Corpuscular Volume 86.7 fL 


 


Mean Corpuscular Hemoglobin 31.0 pg 


 


Mean Corpuscular Hemoglobin


Concent 35.8 g/dl 


 


 


RDW Standard Deviation 39.3 fL 


 


RDW Coefficient of Variation 12.2 % 


 


Platelet Count 193 K/uL 


 


Mean Platelet Volume 9.7 fL 


 


Prothrombin Time 35.5 SECONDS 


 


Prothromb Time International


Ratio 3.2 


 


 


Sodium Level 136 mmol/L 


 


Potassium Level 4.5 mmol/L 


 


Chloride Level 102 mmol/L 


 


Carbon Dioxide Level 26 mmol/L 


 


Anion Gap 8.0 mmol/L 


 


Blood Urea Nitrogen 14 mg/dl 


 


Creatinine 0.98 mg/dl 


 


Est Creatinine Clear Calc


Drug Dose 90.2 ml/min 


 


 


Estimated GFR (


American) 98.1 


 


 


Estimated GFR (Non-


American 84.6 


 


 


BUN/Creatinine Ratio 14.8 


 


Random Glucose 202 mg/dl 


 


Calcium Level 8.8 mg/dl 


 


Phosphorus Level 2.1 mg/dl 


 


Total Bilirubin 0.3 mg/dl 


 


Aspartate Amino Transf


(AST/SGOT) 52 U/L 


 


 


Alanine Aminotransferase


(ALT/SGPT) 81 U/L 


 


 


Alkaline Phosphatase 225 U/L 


 


Total Protein 7.2 gm/dl 


 


Albumin 3.2 gm/dl 


 


Globulin 4.0 gm/dl 


 


Albumin/Globulin Ratio 0.8 











Assessment and Plan


59 y/o male, with PMHx of MS and DVTs on long term anticoagulation, who 

presented to the ED because of profound weakness all over with inability to walk

, and found to have urinary retention with 4 L of urine.


-MRI brain- Multiple foci of increased T2 signal within the periventricular 

deep white matter, and subcortical U fibers. The distribution and orientation 

of the lesions is suspicious for demyelinating process such as multiple 

sclerosis. No evidence of pathologic enhancement. No evidence of acute or 

subacute infarction. Inflammatory changes in left maxillary and frontal 

sinuses. Left occipital scalp mass likely representing a sebaceous cyst


-MRI Lumbar spine: 1. Multilevel spondylitic changes 2. Mild spinal stenosis at 

the L2-3 level, and moderate spinal stenosis at the L3-4 level. 3. On the 

sagittal images, there is an equivocal thin linear focus of increased T2 signal 

within the distal thoracic cord at the T11-12 level 4. No evidence of 

pathologic enhancement. No evidence of pathologic marrow


replacement.


MS flare with generalized weakness, likely secondary to febrile illness from UTI

, Acute sinusitis:


Improving weakness with treatment of infection and also with IV steroids


B12, folate, TSH, RPR, Rapid Flu PCR, Lyme titer all normal or negative





-Consult Neurology, appreciate recommendations


   --Recommend MRI of cervical and thoracic spine --> showed multiple spinal 

cord lesions consistent with MS


   --IV SoluMedrol 1 gm x 3 days initiated after ~1 day treatment of IV 

Rocephin (SoluMedrol started on 1/6), then 1 week Medrol dose long taper 


   --will start disease modifying therapy after discharge as per Neuro


-Consult PT/OT, may need Acute Rehab placement





Urinary retention, UTI, Neurogenic bladder: Ur cx growing E. coli sensitive to 

Rocephin, Acute sinusitis on MRI: Gutierrez placed for 4L of urine on presentation 

and had 8L out in 13 hours! Electrolytes all normal, renal function normal.





-continue Rocephin 2 grams IV q24h and then will switch to Levaquin x total 14 

day course to cover for UTI and sinusitis


-follow BCxs


- renal U/S- No renal abnormalities identified. Bladder wall thickening. 


-Consulted urology, appreciate recommendations. Neurogenic bladder vs BPH or 

combination of both. PSA was 2 as per pt 1  month ago


   --started Tamsulosin 0.4 mg daily 


   --Recommend leaving Gutierrez for 5-7 days for bladder rest and then attempt TOV

( may be done in office). If fails, will need self catheterization teaching


   --NATHALY and PSA check in the future as outpatient. Also recommend UDS testing 

to evaluate bladder function - this also will be done as outpatient in office 


   --Recommend 2 weeks of oral antibiotics 





Hypophosphatemia:


-Phosphorus of 1.7 on admission--> 2.1


-Replaced with phosphate supplement 1 tab PO QID


-d/c po replacement





Elevated LFTs: Alk phos 200s, AST and ALT mildly elevated, Liver US normal. 

Etiology could be from hepatic congestion from fluid overload/urinary retention 

of large amount? Possibly from concurrent infection. Unsure.


--check Alk Phos isoenzymes, hepatits panel


-follow LFTs





hx of DVT:


-Continue Coumadin 10 mg PO QOD and 7.5 mg PO QOD


-Follow INR/PT





DVT prophylaxis:


-Continue Coumadin 





Code Status:


-LEVEL I, FULL 





Dispo:


-Need PT/OT evaluations


-Discharge to ?home/rehab once medically stable

## 2017-01-07 NOTE — NEUROLOGY PROGRESS NOTES
Neurology Progress Note


Date of Service


Jan 7, 2017.





Subjective


Patient feels a little bit stronger over the last 24 hours compared to 

admission.  He was up walking in the halls a little bit with physical therapy.


He has no pain, vision problems, headache, confusion, or speech problems.





Laboratory studies revealed mild anemia and some mildly elevated liver enzymes.


Urine is growing Escherichia coli and is being treated with antibiotics.





Objective











  Date Time  Temp Pulse Resp B/P Pulse Ox O2 Delivery O2 Flow Rate FiO2


 


1/7/17 07:56 36.7 78 18 130/72 96   


 


1/6/17 23:33 36.5 71 18 135/87 97 Room Air  


 


1/6/17 20:00      Room Air  


 


1/6/17 16:05      Room Air  


 


1/6/17 15:29 36.9 81 16 130/86 93 Room Air  


 


1/6/17 12:19      Room Air  


 


1/6/17 08:14 37.8 87 16 103/63 94 Room Air  











Last 24 Hours








Test


  1/7/17


05:27


 


White Blood Count 6.58 K/uL 


 


Red Blood Count 4.35 M/uL 


 


Hemoglobin 13.5 g/dL 


 


Hematocrit 37.7 % 


 


Mean Corpuscular Volume 86.7 fL 


 


Mean Corpuscular Hemoglobin 31.0 pg 


 


Mean Corpuscular Hemoglobin


Concent 35.8 g/dl 


 


 


RDW Standard Deviation 39.3 fL 


 


RDW Coefficient of Variation 12.2 % 


 


Platelet Count 193 K/uL 


 


Mean Platelet Volume 9.7 fL 


 


Prothrombin Time 35.5 SECONDS 


 


Prothromb Time International


Ratio 3.2 


 


 


Sodium Level 136 mmol/L 


 


Potassium Level 4.5 mmol/L 


 


Chloride Level 102 mmol/L 


 


Carbon Dioxide Level 26 mmol/L 


 


Anion Gap 8.0 mmol/L 


 


Blood Urea Nitrogen 14 mg/dl 


 


Creatinine 0.98 mg/dl 


 


Est Creatinine Clear Calc


Drug Dose 90.2 ml/min 


 


 


Estimated GFR (


American) 98.1 


 


 


Estimated GFR (Non-


American 84.6 


 


 


BUN/Creatinine Ratio 14.8 


 


Random Glucose 202 mg/dl 


 


Calcium Level 8.8 mg/dl 


 


Phosphorus Level 2.1 mg/dl 


 


Total Bilirubin 0.3 mg/dl 


 


Aspartate Amino Transf


(AST/SGOT) 52 U/L 


 


 


Alanine Aminotransferase


(ALT/SGPT) 81 U/L 


 


 


Alkaline Phosphatase 225 U/L 


 


Total Protein 7.2 gm/dl 


 


Albumin 3.2 gm/dl 


 


Globulin 4.0 gm/dl 


 


Albumin/Globulin Ratio 0.8 








Exam:


He is awake and alert.  Speech is normal without aphasia or dysarthria.  Mood 

and affect are normal and appropriate.  Thought processes seem intact.


Extraocular eye muscles are intact without nystagmus.  There is no facial droop.


Strength is essentially 5/5 in the left arm and 4/5 in the left leg.  Strength 

is 3/5 proximally in the right leg and 1/5 distally.  In the right arm strength 

is 4/5 proximally and 2-3/5 distally.


There are no abnormal involuntary movements.





 Current Inpatient Medications








 Medications


  (Trade)  Dose


 Ordered  Sig/Miguel


 Route  Start Time


 Stop Time Status Last Admin


Dose Admin


 


 Acetaminophen


  (Tylenol Tab)  650 mg  Q4H  PRN


 PO  1/5/17 13:00


 2/4/17 12:59  1/5/17 20:29


650 MG


 


 Al Hydrox/Mg


 Hydrox/Simethicone


  (Maalox Max Susp)  15 ml  Q4H  PRN


 PO  1/5/17 13:00


 2/4/17 12:59   


 


 


 Magnesium


 Hydroxide


  (Milk Of


 Magnesia Susp)  30 ml  Q6H  PRN


 PO  1/5/17 13:00


 2/4/17 12:59   


 


 


 Polyethylene


  (Miralax Powder


 Packet)  17 gm  DAILY  PRN


 PO  1/5/17 13:00


 2/4/17 12:59   


 


 


 Ondansetron HCl


  (Zofran Inj)  4 mg  Q6H  PRN


 IV  1/5/17 13:00


 2/4/17 12:59   


 


 


 Baclofen


  (Lioresal Tab)  10 mg  BID


 PO  1/5/17 20:00


 2/4/17 20:59  1/6/17 21:31


10 MG


 


 Gadobutrol 9 mmol  9 mmol  UD  PRN


 IV  1/5/17 16:45


 1/9/17 16:44   


 


 


 Ceftriaxone


 Sodium 2000 mg/


 Dextrose  70 ml @ 


 100 mls/hr  Q24H


 IV  1/5/17 22:00


 1/15/17 21:59  1/6/17 21:37


100 MLS/HR


 


 Methylprednisolone


 Sodium Succinate/


 Dextrose


  (Solu-Medrol IV/


 D5 250ml)  266 ml @ 


 250 mls/hr  DAILY


 IV  1/6/17 08:00


 1/8/17 10:00  1/6/17 09:29


250 MLS/HR


 


 Potassium/


 Phosphorus/Sodium


  (Phospha 250


 Neutral


 155-852-130 Mg)  1 tab  QID


 PO  1/6/17 12:00


 2/5/17 11:59  1/6/17 21:32


1 TAB


 


 Tamsulosin HCl


  (Flomax Cap)  0.4 mg  QAM


 PO  1/7/17 08:00


 2/6/17 07:59  1/6/17 11:27


0.4 MG


 


 Lidocaine HCl


  (Xylocaine Jelly


 2%)  1 ml  Q4  PRN


 EXT  1/6/17 11:30


 2/5/17 11:29  1/6/17 18:12


1 ML


 


 Warfarin Sodium


  (Coumadin Tab)  7.5 mg  DAILY@1600


 PO  1/7/17 16:00


 2/6/17 15:59   


 











Impression


1.  Multiple sclerosis





I would classify this patient as initially relapsing remitting with chronic 

secondary progression.





He was doing fairly well until recently.  In all likelihood, he has had an 

acute flare-up, probably secondary to a spine lesion, which has given him 

increased lower extremity weakness as well as urinary retention, to a 

significant degree.  





He also has an E. coli urinary tract infection which could make all of his 

symptoms worse.





Unfortunately, he has not been on any disease modifying therapy for years.





2.  Chronic low back pain





this is stable





3.  Significant urinary retention, requiring catheterization





He is followed by urology





Plan


I spoke for 30 minutes with the patient going over his case, prognosis, testing 

considerations: and treatment options.  





1.  Recommend MRI of the cervical and thoracic spine with and without contrast.





This will help give a diagnosis and prognosis for spine disease which helps in 

his care, as I tend to be more aggressive with treatment for spinal cord MS.


The patient agrees to getting these studies.





2.  Continue IV Solu-Medrol 1 g a day for 3 days, followed by a tapering one 

week Medrol dosepak





3.  Aggressive physical and occupational therapy





Consider transfer to [facility] to accomplish this





4.  Treat urinary tract infection and sinusitis with antibiotics, as you're 

doing.





5.  Consider disease modifying therapy after discharge including oral Gilenya 

versus Tysabri, or other.





The patient follows with Dr. Alonso as an outpatient.

## 2017-01-07 NOTE — DIAGNOSTIC IMAGING REPORT
THORACIC SPINE COMBO



CLINICAL HISTORY: Multiple sclerosis flare.    



COMPARISON STUDY:  No previous studies for comparison.



TECHNIQUE: Utilizing a 1.5 Katheryn magnet, multiplanar, multi echo imaging of the

thoracic spine was performed pre and postcontrast administration. Injection of 8

cc of Gadavist IV was uneventful.



FINDINGS: Alignment of the thoracic spine is anatomic. Vertebral body heights

are maintained. No marrow replacement is identified. There is no

intracanalicular mass or fluid collection. No thoracic cord enhancement is

present. There are numerous foci of increased T2 signal within the thoracic

cord, most evident within the lower thoracic cord. The central canal and neural

from are patent. There are mild multilevel degenerative changes. The

paravertebral soft tissues are unremarkable.



IMPRESSION: Numerous foci of thoracic cord signal abnormality which represent

demyelination and are consistent with multiple sclerosis. No thoracic cord

enhancement.







Electronically signed by:  Harley Jiménez M.D.

1/7/2017 1:24 PM



Dictated Date/Time:  1/7/2017 1:20 PM

## 2017-01-07 NOTE — DIAGNOSTIC IMAGING REPORT
MRI OF THE CERVICAL SPINE WITH AND WITHOUT CONTRAST



CLINICAL HISTORY: Multiple sclerosis flare. Leg weakness.     



COMPARISON: None



TECHNIQUE:  Utilizing a 1.5 Katheryn magnet and dedicated coil, multiplanar,

multiecho imaging of the cervical spine was performed before and after

intravenous administration of 8 of Gadavist.



FINDINGS: 

 

Alignment of the cervical spine is anatomic. Vertebral body heights are

maintained. There is no suspicious marrow replacement. No intracanalicular mass

or fluid collection is present. There are numerous foci of increased T2 signal

within the cervical cord. These are predominantly within the posterior aspect of

the cord. An area of demyelination at the C2 level extends for 1.5 cm and an

area of demyelination at the C5-C6 level extends for 2 cm. Numerous additional

plaques are present. No cord enhancement is present. 





C2-C3:  The central canal and neural foramen are patent.



C3-C4:  The central canal and neural foramen are patent.



C4-C5:  There is minimal posterior disc osteophyte complex. The central canal is

patent. There is mild to moderate bilateral neural foraminal stenosis.



C5-C6:  The central canal is patent. There is mild bilateral neural foraminal

stenosis.



C6-C7:  There is minimal posterior disc osteophyte complex. The central canal is

patent. There is mild to moderate bilateral neural foraminal stenosis.



C7-T1:  The central canal and neural foramen are patent.



IMPRESSION: 



1. Numerous foci of signal abnormality within the cervical cord consistent with

demyelination. The appearance is consistent with multiple sclerosis. No cervical

cord enhancement.



2. Patent central canal. Moderate multilevel neural foraminal stenosis, as

described above.







Electronically signed by:  Harley Jiménez M.D.

1/7/2017 12:39 PM



Dictated Date/Time:  1/7/2017 12:32 PM

## 2017-01-08 NOTE — NEUROLOGY PROGRESS NOTES
Neurology Progress Note


Date of Service


Jan 8, 2017.





Subjective


Patient feels stronger.  He was up walking yesterday some.  Since initiating 

steroids, his low back pain is considerably improved.





Laboratory studies reveal improved liver enzymes.  White count is elevated but 

he is on steroids.





MRI of the cervical and thoracic spines revealed changes in the cord consistent 

with multiple sclerosis and multiple levels.  I reviewed these films and 

reports with the patient.





Objective











  Date Time  Temp Pulse Resp B/P Pulse Ox O2 Delivery O2 Flow Rate FiO2


 


1/8/17 07:59 36.7 71 16 103/55 95 Room Air  


 


1/8/17 02:09     97 Room Air  


 


1/7/17 23:10 36.7 79 18 148/74 97 Room Air  


 


1/7/17 18:10      Room Air  


 


1/7/17 15:44 36.6 74 18 127/81 95 Room Air  











Last 24 Hours








Test


  1/8/17


05:07


 


White Blood Count 15.75 K/uL 


 


Red Blood Count 3.99 M/uL 


 


Hemoglobin 12.2 g/dL 


 


Hematocrit 35.4 % 


 


Mean Corpuscular Volume 88.7 fL 


 


Mean Corpuscular Hemoglobin 30.6 pg 


 


Mean Corpuscular Hemoglobin


Concent 34.5 g/dl 


 


 


RDW Standard Deviation 40.3 fL 


 


RDW Coefficient of Variation 12.5 % 


 


Platelet Count 228 K/uL 


 


Mean Platelet Volume 9.5 fL 


 


Prothrombin Time 44.1 SECONDS 


 


Prothromb Time International


Ratio 3.9 


 


 


Sodium Level 139 mmol/L 


 


Potassium Level 4.4 mmol/L 


 


Chloride Level 102 mmol/L 


 


Carbon Dioxide Level 27 mmol/L 


 


Anion Gap 10.0 mmol/L 


 


Blood Urea Nitrogen 18 mg/dl 


 


Creatinine 0.92 mg/dl 


 


Est Creatinine Clear Calc


Drug Dose 96.1 ml/min 


 


 


Estimated GFR (


American) 105.9 


 


 


Estimated GFR (Non-


American 91.4 


 


 


BUN/Creatinine Ratio 19.6 


 


Random Glucose 155 mg/dl 


 


Calcium Level 8.3 mg/dl 


 


Phosphorus Level 3.3 mg/dl 


 


Total Bilirubin 0.2 mg/dl 


 


Direct Bilirubin < 0.1 mg/dl 


 


Aspartate Amino Transf


(AST/SGOT) 36 U/L 


 


 


Alanine Aminotransferase


(ALT/SGPT) 77 U/L 


 


 


Alkaline Phosphatase 189 U/L 


 


Total Protein 6.4 gm/dl 


 


Albumin 3.0 gm/dl 


 


Hepatitis B Surface Antigen NEG 








Imaging:


MRI OF THE CERVICAL SPINE WITH AND WITHOUT CONTRAST





CLINICAL HISTORY: Multiple sclerosis flare. Leg weakness.     





COMPARISON: None





TECHNIQUE:  Utilizing a 1.5 Katheryn magnet and dedicated coil, multiplanar,


multiecho imaging of the cervical spine was performed before and after


intravenous administration of 8 of Gadavist.





FINDINGS: 


 


Alignment of the cervical spine is anatomic. Vertebral body heights are


maintained. There is no suspicious marrow replacement. No intracanalicular mass


or fluid collection is present. There are numerous foci of increased T2 signal


within the cervical cord. These are predominantly within the posterior aspect of


the cord. An area of demyelination at the C2 level extends for 1.5 cm and an


area of demyelination at the C5-C6 level extends for 2 cm. Numerous additional


plaques are present. No cord enhancement is present. 








C2-C3:  The central canal and neural foramen are patent.





C3-C4:  The central canal and neural foramen are patent.





C4-C5:  There is minimal posterior disc osteophyte complex. The central canal is


patent. There is mild to moderate bilateral neural foraminal stenosis.





C5-C6:  The central canal is patent. There is mild bilateral neural foraminal


stenosis.





C6-C7:  There is minimal posterior disc osteophyte complex. The central canal is


patent. There is mild to moderate bilateral neural foraminal stenosis.





C7-T1:  The central canal and neural foramen are patent.





IMPRESSION: 





1. Numerous foci of signal abnormality within the cervical cord consistent with


demyelination. The appearance is consistent with multiple sclerosis. No cervical


cord enhancement.





2. Patent central canal. Moderate multilevel neural foraminal stenosis, as


described above.











Electronically signed by:  Harley Jiménez M.D.


1/7/2017 12:39 PM





THORACIC SPINE COMBO





CLINICAL HISTORY: Multiple sclerosis flare.    





COMPARISON STUDY:  No previous studies for comparison.





TECHNIQUE: Utilizing a 1.5 Katheryn magnet, multiplanar, multi echo imaging of the


thoracic spine was performed pre and postcontrast administration. Injection of 8


cc of Gadavist IV was uneventful.





FINDINGS: Alignment of the thoracic spine is anatomic. Vertebral body heights


are maintained. No marrow replacement is identified. There is no


intracanalicular mass or fluid collection. No thoracic cord enhancement is


present. There are numerous foci of increased T2 signal within the thoracic


cord, most evident within the lower thoracic cord. The central canal and neural


from are patent. There are mild multilevel degenerative changes. The


paravertebral soft tissues are unremarkable.





IMPRESSION: Numerous foci of thoracic cord signal abnormality which represent


demyelination and are consistent with multiple sclerosis. No thoracic cord


enhancement.











Electronically signed by:  Harley Jiménez M.D.


Exam:


He is awake and alert.  Speech is normal without aphasia or dysarthria.  Mood 

and affect are normal and appropriate.  Thought processes are intact.  There 

are no abnormal involuntary movements.  There is no facial droop.  Extraocular 

eye muscles are intact without nystagmus.





 Current Inpatient Medications








 Medications


  (Trade)  Dose


 Ordered  Sig/Miguel


 Route  Start Time


 Stop Time Status Last Admin


Dose Admin


 


 Acetaminophen


  (Tylenol Tab)  650 mg  Q4H  PRN


 PO  1/5/17 13:00


 2/4/17 12:59  1/5/17 20:29


650 MG


 


 Al Hydrox/Mg


 Hydrox/Simethicone


  (Maalox Max Susp)  15 ml  Q4H  PRN


 PO  1/5/17 13:00


 2/4/17 12:59   


 


 


 Magnesium


 Hydroxide


  (Milk Of


 Magnesia Susp)  30 ml  Q6H  PRN


 PO  1/5/17 13:00


 2/4/17 12:59   


 


 


 Polyethylene


  (Miralax Powder


 Packet)  17 gm  DAILY  PRN


 PO  1/5/17 13:00


 2/4/17 12:59   


 


 


 Ondansetron HCl


  (Zofran Inj)  4 mg  Q6H  PRN


 IV  1/5/17 13:00


 2/4/17 12:59   


 


 


 Baclofen


  (Lioresal Tab)  10 mg  BID


 PO  1/5/17 20:00


 2/4/17 20:59  1/8/17 08:29


10 MG


 


 Gadobutrol 9 mmol  9 mmol  UD  PRN


 IV  1/5/17 16:45


 1/9/17 16:44   


 


 


 Ceftriaxone


 Sodium 2000 mg/


 Dextrose  70 ml @ 


 100 mls/hr  Q24H


 IV  1/5/17 22:00


 1/15/17 21:59  1/7/17 22:42


100 MLS/HR


 


 Methylprednisolone


 Sodium Succinate/


 Dextrose


  (Solu-Medrol IV/


 D5 250ml)  266 ml @ 


 250 mls/hr  DAILY


 IV  1/6/17 08:00


 1/8/17 10:00  1/8/17 08:32


250 MLS/HR


 


 Tamsulosin HCl


  (Flomax Cap)  0.4 mg  QAM


 PO  1/7/17 08:00


 2/6/17 07:59  1/8/17 08:29


0.4 MG


 


 Lidocaine HCl


  (Xylocaine Jelly


 2%)  1 ml  Q4  PRN


 EXT  1/6/17 11:30


 2/5/17 11:29  1/6/17 18:12


1 ML


 


 Warfarin Sodium


  (Coumadin Tab)  7.5 mg  DAILY@1600


 PO  1/7/17 16:00


 2/6/17 15:59 Future Hold 1/7/17 16:53


7.5 MG











Impression


1.  Multiple sclerosis





I would classify this patient as initially relapsing remitting with chronic 

secondary progression.





He was doing fairly well until recently.  In all likelihood, he has had an 

acute flare-up, probably secondary to a spine lesion, which has given him 

increased lower extremity weakness as well as urinary retention, to a 

significant degree.





He has significant multiple sclerosis lesions in his brain, cervical spine, and 

thoracic spine  





He also has an E. coli urinary tract infection which could make all of his 

symptoms worse.





Unfortunately, he has not been on any disease modifying therapy for years.





2.  Chronic low back pain secondary to degenerative changes





This is stable and improved on steroids





3.  Significant urinary retention, requiring catheterization





He is followed by urology





Plan


1.  Continue IV Solu-Medrol 1 g a day for 3 days, followed by a tapering one 

week Medrol dosepak.  Today is his last day.





2.  Aggressive physical and occupational therapy





Consider transfer to [facility] to accomplish this





3.  Treat urinary tract infection and sinusitis with antibiotics, as you're 

doing.





4.  Consider disease modifying therapy after discharge including oral Gilenya 

versus Tysabri, or other.





The patient follows with Dr. Alonso as an outpatient.





I have no further neurologic testing or treatment recommendations to make at 

this time.  Please contact me if I can be of further assistance on this case.

## 2017-01-08 NOTE — PROGRESS NOTE
Subjective


Date of Service:


Jan 8, 2017.


Subjective


Pt evaluation today including:  conversation w/ patient, conversation w/ family

, physical exam, chart review, lab review, review of studies, conversation w/ 

consultant, review of inpatient medication list


Pain:  mild left abdominal pain


Voiding:  gutierrez catheter in place


had long conversation with the patient about a neurogenic bladder and possible 

need for learning cic. He feels he could do this if he had to . Not clear if pt 

has had retntion for a long while but he notes a long standing history of 

difficulty voiding. We discussed prostate and he states he had a psa he 

believes in the 2 range six weeks ago/ He understands he will need a voiding 

trial in a week and possibly a cystometrogram





Problem List


Medical Problems:


(1) Exacerbation of multiple sclerosis


Status: Acute  





(2) Lower extremity weakness


Status: Acute  





(3) Urinary retention


Status: Acute  





(4) UTI (urinary tract infection)


Status: Acute  











Review of Systems


Constitutional:  No chills, No fatigue, No fever, No problem reported, No see 

HPI, No sweats, No weakness, No weight loss





Objective


Vital Signs











  Date Time  Temp Pulse Resp B/P Pulse Ox O2 Delivery O2 Flow Rate FiO2


 


1/8/17 16:18 36.6 70 15 139/78 97 Room Air  


 


1/8/17 15:49      Room Air  


 


1/8/17 10:07      Room Air  


 


1/8/17 07:59 36.7 71 16 103/55 95 Room Air  


 


1/8/17 02:09     97 Room Air  


 


1/7/17 23:10 36.7 79 18 148/74 97 Room Air  











Laboratory Results





Last 24 Hours








Test


  1/8/17


05:07


 


White Blood Count 15.75 K/uL 


 


Red Blood Count 3.99 M/uL 


 


Hemoglobin 12.2 g/dL 


 


Hematocrit 35.4 % 


 


Mean Corpuscular Volume 88.7 fL 


 


Mean Corpuscular Hemoglobin 30.6 pg 


 


Mean Corpuscular Hemoglobin


Concent 34.5 g/dl 


 


 


RDW Standard Deviation 40.3 fL 


 


RDW Coefficient of Variation 12.5 % 


 


Platelet Count 228 K/uL 


 


Mean Platelet Volume 9.5 fL 


 


Prothrombin Time 44.1 SECONDS 


 


Prothromb Time International


Ratio 3.9 


 


 


Sodium Level 139 mmol/L 


 


Potassium Level 4.4 mmol/L 


 


Chloride Level 102 mmol/L 


 


Carbon Dioxide Level 27 mmol/L 


 


Anion Gap 10.0 mmol/L 


 


Blood Urea Nitrogen 18 mg/dl 


 


Creatinine 0.92 mg/dl 


 


Est Creatinine Clear Calc


Drug Dose 96.1 ml/min 


 


 


Estimated GFR (


American) 105.9 


 


 


Estimated GFR (Non-


American 91.4 


 


 


BUN/Creatinine Ratio 19.6 


 


Random Glucose 155 mg/dl 


 


Calcium Level 8.3 mg/dl 


 


Phosphorus Level 3.3 mg/dl 


 


Total Bilirubin 0.2 mg/dl 


 


Direct Bilirubin < 0.1 mg/dl 


 


Aspartate Amino Transf


(AST/SGOT) 36 U/L 


 


 


Alanine Aminotransferase


(ALT/SGPT) 77 U/L 


 


 


Alkaline Phosphatase 189 U/L 


 


Total Protein 6.4 gm/dl 


 


Albumin 3.0 gm/dl 


 


Hepatitis B Surface Antigen NEG 


 


Hepatitis C Antibody NEG 











Assessment and Plan


Pt has had antibiotics adjusted and will need a f/u voiding trial in 7 to 10 

days . CIC if unable to void and CMG then to confirm assess for neurogenic 

bladder

## 2017-01-08 NOTE — HOSPITALIST PROGRESS NOTE
Hospitalist Progress Note


Date of Service


Jan 8, 2017.





Subjective


Pt evaluation today including:  conversation w/ patient, physical exam, chart 

review, lab review, review of studies, conversation w/ consultant (Neuro), 

review of inpatient medication list


PO Intake:  agnes po


Voiding:  gutierrez catheter in place


Pt now resigned to the fact that he will need treatment for his MS. Still 

disappointed he may have to self-cath bladder in the future. Needs acute rehab 

but no beds available today. Feels a little stronger today, no pain.





   Constitutional:  No fever


   Eyes:  No problem reported


   ENT:  No nasal symptoms


   Respiratory:  No cough, No shortness of breath


   Cardiovascular:  No chest pain


   Abdomen:  No pain


   Musculoskeletal:  No problem reported


   Neurologic:  + numbness/tingling, + weakness


   Psychiatric:  No problem reported


   Heme:  No problem reported


   Endo:  No problem reported


   Skin:  No problem reported


   All Other Systems:  Reviewed and Negative





Objective


Vital Signs











  Date Time  Temp Pulse Resp B/P Pulse Ox O2 Delivery O2 Flow Rate FiO2


 


1/8/17 07:59 36.7 71 16 103/55 95 Room Air  


 


1/8/17 02:09     97 Room Air  


 


1/7/17 23:10 36.7 79 18 148/74 97 Room Air  


 


1/7/17 18:10      Room Air  


 


1/7/17 15:44 36.6 74 18 127/81 95 Room Air  











Physical Exam


General Appearance:  WD/WN, no apparent distress


Eyes:  normal inspection, sclerae normal


ENT:  hearing grossly normal


Neck:  trachea midline


Respiratory/Chest:  lungs clear, normal breath sounds, no respiratory distress, 

no accessory muscle use


Cardiovascular:  regular rate, rhythm, no murmur, + pertinent finding (1trace 

pitting edema legs bilat ot knees)


Abdomen:  normal bowel sounds, non tender, soft, + pertinent finding (Gutierrez in 

place draining clear yellow urine)


Extremities:  no calf tenderness


Neurologic/Psychiatric:  alert, normal mood/affect, oriented x 3, + motor 

weakness (4+/5 in RUE improved, RLE 2-3/5 in hip flexor, 0/5 in foot plantar 

and dorsiflexion)


Skin:  normal color, warm/dry, no rash





Laboratory Results





Last 24 Hours








Test


  1/8/17


05:07


 


White Blood Count 15.75 K/uL 


 


Red Blood Count 3.99 M/uL 


 


Hemoglobin 12.2 g/dL 


 


Hematocrit 35.4 % 


 


Mean Corpuscular Volume 88.7 fL 


 


Mean Corpuscular Hemoglobin 30.6 pg 


 


Mean Corpuscular Hemoglobin


Concent 34.5 g/dl 


 


 


RDW Standard Deviation 40.3 fL 


 


RDW Coefficient of Variation 12.5 % 


 


Platelet Count 228 K/uL 


 


Mean Platelet Volume 9.5 fL 


 


Prothrombin Time 44.1 SECONDS 


 


Prothromb Time International


Ratio 3.9 


 


 


Sodium Level 139 mmol/L 


 


Potassium Level 4.4 mmol/L 


 


Chloride Level 102 mmol/L 


 


Carbon Dioxide Level 27 mmol/L 


 


Anion Gap 10.0 mmol/L 


 


Blood Urea Nitrogen 18 mg/dl 


 


Creatinine 0.92 mg/dl 


 


Est Creatinine Clear Calc


Drug Dose 96.1 ml/min 


 


 


Estimated GFR (


American) 105.9 


 


 


Estimated GFR (Non-


American 91.4 


 


 


BUN/Creatinine Ratio 19.6 


 


Random Glucose 155 mg/dl 


 


Calcium Level 8.3 mg/dl 


 


Phosphorus Level 3.3 mg/dl 


 


Total Bilirubin 0.2 mg/dl 


 


Direct Bilirubin < 0.1 mg/dl 


 


Aspartate Amino Transf


(AST/SGOT) 36 U/L 


 


 


Alanine Aminotransferase


(ALT/SGPT) 77 U/L 


 


 


Alkaline Phosphatase 189 U/L 


 


Total Protein 6.4 gm/dl 


 


Albumin 3.0 gm/dl 


 


Hepatitis B Surface Antigen NEG 


 


Hepatitis C Antibody NEG 











Assessment and Plan


59 y/o male, with PMHx of MS and DVTs on long term anticoagulation, who 

presented to the ED because of profound weakness all over with inability to walk

, and found to have urinary retention with 4 L of urine, MS flare





-MRI brain- Multiple foci of increased T2 signal within the periventricular 

deep white matter, and subcortical U fibers. The distribution and orientation 

of the lesions is suspicious for demyelinating process such as multiple 

sclerosis. No evidence of pathologic enhancement. No evidence of acute or 

subacute infarction. Inflammatory changes in left maxillary and frontal 

sinuses. Left occipital scalp mass likely representing a sebaceous cyst


-MRI Lumbar spine: 1. Multilevel spondylitic changes 2. Mild spinal stenosis at 

the L2-3 level, and moderate spinal stenosis at the L3-4 level. 3. On the 

sagittal images, there is an equivocal thin linear focus of increased T2 signal 

within the distal thoracic cord at the T11-12 level 4. No evidence of 

pathologic enhancement. No evidence of pathologic marrow


replacement.


MS flare with generalized weakness, likely secondary to febrile illness from UTI

, Acute sinusitis:


Improving weakness with treatment of infection and also with IV steroids


B12, folate, TSH, RPR, Rapid Flu PCR, Lyme titer all normal or negative





-Consult Neurology, appreciate recommendations


   --Recommend MRI of cervical and thoracic spine --> showed multiple spinal 

cord lesions consistent with MS


   --IV SoluMedrol 1 gm x 3 days initiated --> then 1 week Medrol dose pack 

taper to start 1/9 


   --will start disease modifying therapy after discharge as per Neuro-f/u with 

Dr. Villalobos in the office in 2-3 weeks


-Consult PT/OT, needs Acute Rehab placement tomorrow when bed available





Urinary retention, UTI, Neurogenic bladder: Ur cx growing E. coli sensitive to 

Rocephin and Levaquin, Acute sinusitis on MRI: Gutierrez placed for 4L of urine on 

presentation and had 8L out in 13 hours! Electrolytes all normal, renal 

function normal.





-received Rocephin 2 grams IV q24h x 3 days and today will switch to Levaquin x 

total 14 day course to cover for UTI and sinusitis


-follow BCxs


- renal U/S- No renal abnormalities identified. Bladder wall thickening. 


-Consulted urology, appreciate recommendations. Neurogenic bladder vs BPH or 

combination of both. PSA was 2.0 as per pt 1  month ago


   --started Tamsulosin 0.4 mg daily 


   --Recommend leaving Gutierrez for 5-7 days for bladder rest and then attempt TOV

( may be done in office). If fails, will need self catheterization teaching


   --NATHALY and PSA check in the future as outpatient. Also recommend UDS testing 

to evaluate bladder function - this also will be done as outpatient in office 





Hypophosphatemia:


-Phosphorus of 1.7 on admission--> 2.1


-Replaced with phosphate supplement 1 tab PO QID


-d/c'd po replacement





Elevated LFTs: Alk phos 200s, AST and ALT mildly elevated--> now all improving 

significantly, Liver US normal. Etiology could be from hepatic congestion from 

fluid overload/urinary retention of large amount? Possibly from concurrent 

infection. Improving now


--f/u Alk Phos isoenzymes, hepatits panel


-follow LFTs





hx of DVT:


-Continue Coumadin but hold today for INR 3.9


-Follow INR/PT





DVT prophylaxis:


Coumadin 





Code Status:


-LEVEL I, FULL 





Dispo:


-Need PT/OT evaluations


-Discharge to ?home/rehab once medically stable

## 2017-01-09 NOTE — PROGRESS NOTE
Subjective


Date of Service:


Jan 9, 2017.


Subjective


Pt evaluation today including:  conversation w/ patient, chart review, lab 

review


Voiding:  gutierrez catheter in place (patent, draining clear, yellow urine )


57 yo male with MS and urinary retention. 


Gutierrez draining clear, yellow urine this morning. 


He remains on Flomax. 


UC&S growing e coli. Sensitive to Levaquin.





Problem List


Medical Problems:


(1) Exacerbation of multiple sclerosis


Status: Acute  





(2) Lower extremity weakness


Status: Acute  





(3) Urinary retention


Status: Acute  





(4) UTI (urinary tract infection)


Status: Acute  











Review of Systems


Constitutional:  No chills, No fever


Respiratory:  No shortness of breath


Cardiac:  No chest pain


Abdomen:  No nausea, No pain, No vomiting


Male :  No hematuria


Heme:  No abnormal bleeding/bruising





Objective


Vital Signs











  Date Time  Temp Pulse Resp B/P Pulse Ox O2 Delivery O2 Flow Rate FiO2


 


1/9/17 08:33 36.8 66 16 103/54 97 Room Air  


 


1/9/17 00:00      Room Air  


 


1/8/17 23:04 36.7 64 14 125/73 97 Room Air  


 


1/8/17 20:55      Room Air  


 


1/8/17 16:18 36.6 70 15 139/78 97 Room Air  


 


1/8/17 15:49      Room Air  


 


1/8/17 10:07      Room Air  











Physical Exam


General Appearance:  no apparent distress


Eyes:  normal inspection


ENT:  hearing grossly normal


Neck:  no JVD


Respiratory/Chest:  no respiratory distress, no accessory muscle use


Cardiovascular:  no JVD


Extremities:  normal inspection


Neurologic/Psychiatric:  alert, normal mood/affect, oriented x 3


Skin:  normal color





Laboratory Results





Last 24 Hours








Test


  1/9/17


05:50


 


White Blood Count 15.27 K/uL 


 


Red Blood Count 3.86 M/uL 


 


Hemoglobin 11.8 g/dL 


 


Hematocrit 34.0 % 


 


Mean Corpuscular Volume 88.1 fL 


 


Mean Corpuscular Hemoglobin 30.6 pg 


 


Mean Corpuscular Hemoglobin


Concent 34.7 g/dl 


 


 


Platelet Count 228 K/uL 


 


Mean Platelet Volume 9.4 fL 


 


Neutrophils (%) (Auto) 87.6 % 


 


Lymphocytes (%) (Auto) 6.7 % 


 


Monocytes (%) (Auto) 4.7 % 


 


Eosinophils (%) (Auto) 0.0 % 


 


Basophils (%) (Auto) 0.1 % 


 


Neutrophils # (Auto) 13.37 K/uL 


 


Lymphocytes # (Auto) 1.03 K/uL 


 


Monocytes # (Auto) 0.72 K/uL 


 


Eosinophils # (Auto) 0.00 K/uL 


 


Basophils # (Auto) 0.01 K/uL 


 


RDW Standard Deviation 39.9 fL 


 


RDW Coefficient of Variation 12.5 % 


 


Immature Granulocyte % (Auto) 0.9 % 


 


Immature Granulocyte # (Auto) 0.14 K/uL 


 


Prothrombin Time 34.8 SECONDS 


 


Prothromb Time International


Ratio 3.1 


 


 


Sodium Level 140 mmol/L 


 


Potassium Level 4.5 mmol/L 


 


Chloride Level 104 mmol/L 


 


Carbon Dioxide Level 28 mmol/L 


 


Anion Gap 8.0 mmol/L 


 


Blood Urea Nitrogen 18 mg/dl 


 


Creatinine 0.83 mg/dl 


 


Est Creatinine Clear Calc


Drug Dose 106.5 ml/min 


 


 


Estimated GFR (


American) 112.4 


 


 


Estimated GFR (Non-


American 97.0 


 


 


BUN/Creatinine Ratio 21.1 


 


Random Glucose 121 mg/dl 


 


Calcium Level 8.1 mg/dl 


 


Magnesium Level 2.9 mg/dl 


 


Total Bilirubin 0.3 mg/dl 


 


Direct Bilirubin 0.1 mg/dl 


 


Aspartate Amino Transf


(AST/SGOT) 28 U/L 


 


 


Alanine Aminotransferase


(ALT/SGPT) 66 U/L 


 


 


Alkaline Phosphatase 159 U/L 


 


Total Protein 5.9 gm/dl 


 


Albumin 2.7 gm/dl 











Assessment and Plan


A/P: Urinary retention





D/c home with gutierrez catheter. Will plan for outpatient TOV and possible [facility] 

teaching in 1 week. May need UDS as outpatient for further evaluation of 

retention as well. 


Continue Flomax. 


No further  management at this time. Will sign off for now. Will arrange for 

outpatient f/u. Recall PRN  issues. Thanks for allowing us to participate in 

this pt's care.

## 2017-01-09 NOTE — DISCHARGE INSTRUCTIONS
Discharge Instructions


Admission


Reason for Admission:  Exacerbation Of Multiple Sclerosis, Uti





Discharge


Discharge Diagnosis / Problem:  MS flare, urinary retention





Discharge Goals


Goal(s):  Improve function





Activity Recommendations


Activity Limitations:  as noted below


Exercise/Sports Limitations:  as tolerated





.





Instructions / Follow-Up


Instructions / Follow-Up


You have been treated in the hospital for a multiple sclerosis flare, urinary 

retention and urinary tract infection.  It seems that you have responded well 

to IV steroids.





You have been prescribed a Medrol Dosepak.  Please take as prescribed.





You have also been prescribed Levaquin 500 mg 1 tablet daily.  Please finish 

the entire course of antibiotics.





Please recheck the following labs:


-INR in 3 days


-CMP in 2 weeks





The Ascencio catheter will reside in your bladder until the follow-up with the 

urologist.  Arrangements are being made for a follow-up appointment.  It may be 

necessary for you to continue to do self catheterizations at home.  The urology 

Dr. will instruct you on how to perform this properly





Please follow-up with your primary care physician within one week





Please also follow up with the neurologist, Dr. Alonso  In 2-3 weeks 





Return to the emergency department if you have any of the following symptoms:


-Fever of 102F or greater


-Persistent vomiting


- Persistent diarrhea


-Lethargy


-Chest pain


-Shortness of breath


-Worsening Focal weakness





Current Hospital Diet


Patient's current hospital diet: Regular Diet





Discharge Diet


Recommended Diet:  Regular Diet





Procedures


Procedures Performed:  


MRI-brain, cervical, thoracic and lumbar spine





Pending Studies


Studies pending at discharge:  no





Medical Emergencies








.


Who to Call and When:





Medical Emergencies:  If at any time you feel your situation is an emergency, 

please call 911 immediately.





.





Non-Emergent Contact


Non-Emergency issues call your:  Primary Care Provider





.


.





"Provider Documentation" section prepared by Carie Myers.





VTE Core Measure


Inpt VTE Proph given/why not?:  Warfarin (Coumadin), T.E.D. Stockings, SCD's

## 2017-01-09 NOTE — DISCHARGE SUMMARY
Discharge Summary


Admission Date:


Jan 5, 2017 at 13:14


Discharge Date:  Jan 9, 2017


Discharge Disposition:  Rehab


Principal Diagnosis:  MS flare, urinary retention, UTI


Immunizations:  


   Have You Had Influenza Vaccine:  Yes


   History of Tetanus Vaccine?:  Unknown


   History of Pneumococcal:  Unknown


   History of Hepatitis B Vaccine:  Unknown


Procedures:


MRI head, cervical spine, thoracic spine, lumbar spine


-Results as noted below


Consultations:


Neurology


 (Carie Myers PA-C)





Medication Reconciliation


New Medications:  


Methylprednisolone (Medrol Dosepak) 4 Mg Adria


1 PKT PO UD for 6 Days, #1 PKT





Levofloxacin (Levofloxacin) 500 Mg Tab


500 MG PO DAILY@11 for 6 Days, TAB





Lidocaine HCl (Lidocaine HCl Jelly) 30 Ml Gel


1 ML EXT Q4 PRN for gutierrez irritation for 30 Days





Tamsulosin HCl (Tamsulosin HCl) 0.4 Mg Cap


0.4 MG PO QAM for 30 Days, CAP





 


Continued Medications:  


Baclofen (Baclofen *) 10 Mg Tab


10 MG PO BID, 0 Refills





Multiple Vitamins W/ Minerals (Centrum Multivitamin Flav) 1 Pow Pow


1 TAB PO DAILY





Sildenafil Citrate (Viagra) 100 Mg Tab


1 TAB PO DAILY PRN for PRN, #6 TAB 11 Refills





Warfarin Sod (Jantoven) 10 Mg Tab


10 MG PO alternate, TAB





Warfarin Sod (Jantoven) 7.5 Mg Tab


7.5 MG PO alternate, TAB











Referrals At Discharge


Follow up Referrals:  


Neurologist Referral - Within 1-2 Weeks with Abe Alonso M.D.


Physician Referral - Within 1 Week with Mike Moran M.D.








Discharge Exam


Patient feeling well today.  Thinks that he is back at baseline.  Was able to 

get up and shower without difficulty.  Denies any fever or chills.  No chest 

pain, shortness of breath, heart palpitations, nausea or dizziness


Review of Systems:  


   Constitutional:  No fever


   Respiratory:  No shortness of breath


   Cardiovascular:  No chest pain


   Abdomen:  No nausea


Physical Exam:  


   General Appearance:  no apparent distress


   Eyes:  EOMI


   Neck:  no JVD


   Respiratory/Chest:  lungs clear


   Cardiovascular:  regular rate, rhythm


   Abdomen / GI:  normal bowel sounds, non tender, soft


   Extremities:  no pedal edema


   Neurologic/Psychiatric:  + pertinent finding (persistent weakness noted in 

the right lower extremity)


   Skin:  warm/dry


 (Carie Myers PA-C)





Hospital Course


59 y/o male, with PMHx of MS and DVTs on long term anticoagulation, who 

presented to the ED because of profound weakness all over with inability to walk

, and found to have urinary retention with 4 L of urine, MS flare





-MRI brain- Multiple foci of increased T2 signal within the periventricular 

deep white matter, and subcortical U fibers. The distribution and orientation 

of the lesions is suspicious for demyelinating process such as multiple 

sclerosis. No evidence of pathologic enhancement. No evidence of acute or 

subacute infarction. Inflammatory changes in left maxillary and frontal 

sinuses. Left occipital scalp mass likely representing a sebaceous cyst


-MRI Lumbar spine: 1. Multilevel spondylitic changes 2. Mild spinal stenosis at 

the L2-3 level, and moderate spinal stenosis at the L3-4 level. 3. On the 

sagittal images, there is an equivocal thin linear focus of increased T2 signal 

within the distal thoracic cord at the T11-12 level 4. No evidence of 

pathologic enhancement. No evidence of pathologic marrow


replacement.


MS flare with generalized weakness, likely secondary to febrile illness from UTI

, Acute sinusitis:   Now at baseline


Improving weakness with treatment of infection and also with IV steroids


B12, folate, TSH, RPR, Rapid Flu PCR, Lyme titer all normal or negative





-Consult Neurology, appreciate recommendations


   --Recommend MRI of cervical and thoracic spine --> showed multiple spinal 

cord lesions consistent with MS


   --IV SoluMedrol 1 gm x 3 days initiated --> then 1 week Medrol dose pack 

taper to start 1/9 


   --will start disease modifying therapy after discharge as per Neuro-f/u with 

Dr. Alonso in the office in 2-3 weeks


-Consult PT/OT, needs Acute Rehab-to [facility] when bed available





Urinary retention, UTI, Neurogenic bladder: Ur cx growing E. coli sensitive to 

Rocephin and Levaquin, Acute sinusitis on MRI: Gutierrez placed for 4L of urine on 

presentation and had 8L out in 13 hours! Electrolytes all normal, renal 

function normal.


-received Rocephin 2 grams IV q24h x 3 days and today will switch to Levaquin x 

total 14 day course to cover for UTI and sinusitis


-follow BCxs


- renal U/S- No renal abnormalities identified. Bladder wall thickening. 


-Consulted urology, appreciate recommendations. Neurogenic bladder vs BPH or 

combination of both. PSA was 2.0 as per pt 1  month ago


   --started Tamsulosin 0.4 mg daily 


   --Recommend leaving Gutierrez for 5-7 days for bladder rest and then attempt TOV

( may be done in office). If fails, will need self catheterization teaching


   --NATHALY and PSA check in the future as outpatient. Also recommend UDS testing 

to evaluate bladder function - this also will be done as outpatient in office 


   --Needs 6 more days of po levaquin 500 mg daily





Hypophosphatemia:


-Phosphorus of 1.7 on admission--> 2.1


-Replaced with phosphate supplement 1 tab PO QID


-d/c'd po replacement





Elevated LFTs: Alk phos 200s, AST and ALT mildly elevated--> now all improving 

significantly, Liver US normal. Etiology could be from hepatic congestion from 

fluid overload/urinary retention of large amount? Possibly from concurrent 

infection. Improving now


--repeat CMP as outpt in 2 weeks





hx of DVT:


-Continue Coumadin alternating doses of 7.5 and 10 mg daily--may need to be 

adjusted with steroids and ABX


-Follow up INR in 3 days





DVT prophylaxis:


Coumadin 





Code Status:


-LEVEL I, FULL 





Dispo:


-[facility]


Total Time Spent:  Greater than 30 minutes


This includes examination of the patient, discharge planning, medication 

reconciliation, and communication with other providers.


 (Carie Myers PA-C)


i personally examined pt and verified all miller points w A [facility] PAC


ready to go to [facility]


no new problems.  awaiting insurance approval and a bed.  


nad


breathing unlabored





MS flare, UTI, neurogenic bladder


-as above, for rehab once approved and available.


 (Nuno Shultz, D.O.)


Discharge Instructions


Please refer to the electronic Patient Visit Report (Discharge Instructions) 

for additional information.


 (Carie Myers PA-C)





Follow-Up


PCP 1 week


neuro 2-3 weeks


 (Carie Myers PA-C)





Additional Copies To


Abe Alonso M.D.; Mike Moran M.D.

## 2017-01-10 NOTE — HOSPITALIST PROGRESS NOTE
Hospitalist Progress Note


Date of Service


Mark 10, 2017.


 (Carie Myers PA-C)





Subjective


Pt evaluation today including:  conversation w/ patient, physical exam, chart 

review


Patient has no new complaints today.





   Additional Comments:


6 system review negative. Please see pertinent positives in the history of 

present illness section.


 (Carie Myers PA-C)





Objective


Vital Signs











  Date Time  Temp Pulse Resp B/P Pulse Ox O2 Delivery O2 Flow Rate FiO2


 


1/10/17 08:30      Room Air  


 


1/10/17 07:30 36.9 60 20 111/57 96 Room Air  


 


1/10/17 00:10      Room Air  


 


1/9/17 23:36 37.0 66 18 116/70 99 Room Air  


 


1/9/17 20:50      Room Air  


 


1/9/17 16:00     96 Room Air  


 


1/9/17 15:08 36.4 71 20 134/82 96 Room Air  








 (Carie Myers PA-C)





Physical Exam


General Appearance:  no apparent distress


Neck:  no JVD


Respiratory/Chest:  lungs clear


Cardiovascular:  regular rate, rhythm


Abdomen:  normal bowel sounds, non tender, soft


Extremities:  no pedal edema


Neurologic/Psychiatric:  oriented x 3


 (Carie Myers PA-C)





Assessment and Plan


59 y/o male, with PMHx of MS and DVTs on long term anticoagulation, who 

presented to the ED because of profound weakness all over with inability to walk

, and found to have urinary retention with 4 L of urine, MS flare-improved








Please refer to discharge summary from 1/9/2017.  Patient's discharge was 

delayed one day for insurance authorization.  No changes from plan of care on 

discharge summary


 (Carie Myers PA-C)

## 2017-04-24 NOTE — CODING QUERY MEDICAL NECESSITY
SUPPORTING DIAGNOSIS NEEDED





A supporting diagnosis is required for the test/procedure performed on this patient in 
order for us to be reimbursed by the patient's insurance. Please provide a supporting 
diagnosis for the following test/procedure listed below next to the test name along with 
your signature. 



*If there is no additional diagnosis for this patient that would support the following 
test/procedure please document that below next to the test/procedure.



Test(s)/Procedure(s) that require a supporting diagnosis:

DOS 4/18

* Vitamin D      DIAGNOSIS:



Provider Signature:  ______________________________  Date:  _______



Thank you  

Mary Riggs

Health Information Management

Phone:  [phone]

Fax:  [phone]



Once completed, please kindly fax back to [phone]



For questions please call [phone]